# Patient Record
Sex: FEMALE | Race: ASIAN | HISPANIC OR LATINO | Employment: UNEMPLOYED | URBAN - METROPOLITAN AREA
[De-identification: names, ages, dates, MRNs, and addresses within clinical notes are randomized per-mention and may not be internally consistent; named-entity substitution may affect disease eponyms.]

---

## 2020-06-19 ENCOUNTER — TRANSCRIBE ORDERS (OUTPATIENT)
Dept: ADMINISTRATIVE | Facility: HOSPITAL | Age: 60
End: 2020-06-19

## 2020-06-19 ENCOUNTER — HOSPITAL ENCOUNTER (OUTPATIENT)
Dept: RADIOLOGY | Facility: HOSPITAL | Age: 60
Discharge: HOME | End: 2020-06-19
Attending: INTERNAL MEDICINE
Payer: COMMERCIAL

## 2020-06-19 ENCOUNTER — OFFICE VISIT (OUTPATIENT)
Dept: LAB | Facility: HOSPITAL | Age: 60
End: 2020-06-19
Attending: INTERNAL MEDICINE
Payer: COMMERCIAL

## 2020-06-19 DIAGNOSIS — Z00.8 ENCOUNTER FOR OTHER GENERAL EXAMINATION: Primary | ICD-10-CM

## 2020-06-19 DIAGNOSIS — Z00.8 ENCOUNTER FOR OTHER GENERAL EXAMINATION: ICD-10-CM

## 2020-06-19 PROCEDURE — 71046 X-RAY EXAM CHEST 2 VIEWS: CPT

## 2020-06-19 PROCEDURE — 93005 ELECTROCARDIOGRAM TRACING: CPT

## 2020-06-23 LAB
ATRIAL RATE: 81 BPM
P AXIS: 51 DEGREES
PR INTERVAL: 136 MS
QRS AXIS: 25 DEGREES
QRSD INTERVAL: 80 MS
QT INTERVAL: 366 MS
QTC INTERVAL: 425 MS
T WAVE AXIS: 51 DEGREES
VENTRICULAR RATE: 81 BPM

## 2020-06-23 PROCEDURE — 93010 ELECTROCARDIOGRAM REPORT: CPT | Performed by: INTERNAL MEDICINE

## 2020-06-25 LAB
CREAT ?TM UR-SCNC: 81.8 UMOL/L
HBA1C MFR BLD HPLC: 8.3 %
MICROALBUMIN/CREAT UR: <4 MG/G{CREAT}

## 2020-06-30 ENCOUNTER — OFFICE VISIT (OUTPATIENT)
Dept: CARDIOLOGY CLINIC | Facility: CLINIC | Age: 60
End: 2020-06-30
Payer: COMMERCIAL

## 2020-06-30 VITALS
WEIGHT: 180 LBS | DIASTOLIC BLOOD PRESSURE: 64 MMHG | HEIGHT: 65 IN | TEMPERATURE: 97.9 F | SYSTOLIC BLOOD PRESSURE: 128 MMHG | HEART RATE: 80 BPM | BODY MASS INDEX: 29.99 KG/M2 | OXYGEN SATURATION: 98 %

## 2020-06-30 DIAGNOSIS — E78.5 DYSLIPIDEMIA: ICD-10-CM

## 2020-06-30 DIAGNOSIS — R06.02 EXERTIONAL SHORTNESS OF BREATH: ICD-10-CM

## 2020-06-30 DIAGNOSIS — Z11.59 SCREENING FOR VIRAL DISEASE: ICD-10-CM

## 2020-06-30 DIAGNOSIS — E11.9 TYPE 2 DIABETES MELLITUS WITHOUT COMPLICATION, WITHOUT LONG-TERM CURRENT USE OF INSULIN (HCC): ICD-10-CM

## 2020-06-30 DIAGNOSIS — R94.31 ABNORMAL EKG: ICD-10-CM

## 2020-06-30 DIAGNOSIS — I10 ESSENTIAL HYPERTENSION: ICD-10-CM

## 2020-06-30 DIAGNOSIS — Z82.49 FAMILY HISTORY OF CORONARY ARTERY DISEASE: ICD-10-CM

## 2020-06-30 PROCEDURE — 99245 OFF/OP CONSLTJ NEW/EST HI 55: CPT | Performed by: INTERNAL MEDICINE

## 2020-06-30 RX ORDER — VALSARTAN 80 MG/1
80 TABLET ORAL EVERY MORNING
COMMUNITY
Start: 2020-06-07 | End: 2021-09-13 | Stop reason: SDUPTHER

## 2020-06-30 RX ORDER — LANCETS 30 GAUGE
EACH MISCELLANEOUS DAILY
COMMUNITY
Start: 2020-06-25

## 2020-06-30 RX ORDER — CARVEDILOL 12.5 MG/1
12.5 TABLET ORAL 2 TIMES DAILY
COMMUNITY
Start: 2020-06-18 | End: 2021-08-31 | Stop reason: ALTCHOICE

## 2020-06-30 RX ORDER — BLOOD SUGAR DIAGNOSTIC
STRIP MISCELLANEOUS DAILY
COMMUNITY
Start: 2020-06-25 | End: 2021-10-06 | Stop reason: SDUPTHER

## 2020-06-30 RX ORDER — ASPIRIN 81 MG/1
81 TABLET, CHEWABLE ORAL DAILY
Qty: 30 TABLET | Refills: 1 | Status: SHIPPED | OUTPATIENT
Start: 2020-06-30 | End: 2021-08-24 | Stop reason: ALTCHOICE

## 2020-06-30 RX ORDER — BLOOD-GLUCOSE METER
EACH MISCELLANEOUS
COMMUNITY
Start: 2020-06-29

## 2020-07-02 ENCOUNTER — HOSPITAL ENCOUNTER (OUTPATIENT)
Dept: NON INVASIVE DIAGNOSTICS | Facility: HOSPITAL | Age: 60
Discharge: HOME | End: 2020-07-02
Attending: INTERNAL MEDICINE
Payer: COMMERCIAL

## 2020-07-02 DIAGNOSIS — Z11.59 SCREENING FOR VIRAL DISEASE: ICD-10-CM

## 2020-07-02 DIAGNOSIS — E78.5 DYSLIPIDEMIA: ICD-10-CM

## 2020-07-02 DIAGNOSIS — I10 ESSENTIAL HYPERTENSION: ICD-10-CM

## 2020-07-02 DIAGNOSIS — R94.31 ABNORMAL EKG: ICD-10-CM

## 2020-07-02 DIAGNOSIS — Z82.49 FAMILY HISTORY OF CORONARY ARTERY DISEASE: ICD-10-CM

## 2020-07-02 DIAGNOSIS — E11.9 TYPE 2 DIABETES MELLITUS WITHOUT COMPLICATION, WITHOUT LONG-TERM CURRENT USE OF INSULIN (HCC): ICD-10-CM

## 2020-07-02 DIAGNOSIS — R06.02 EXERTIONAL SHORTNESS OF BREATH: ICD-10-CM

## 2020-07-02 PROCEDURE — 93306 TTE W/DOPPLER COMPLETE: CPT

## 2020-07-02 PROCEDURE — U0003 INFECTIOUS AGENT DETECTION BY NUCLEIC ACID (DNA OR RNA); SEVERE ACUTE RESPIRATORY SYNDROME CORONAVIRUS 2 (SARS-COV-2) (CORONAVIRUS DISEASE [COVID-19]), AMPLIFIED PROBE TECHNIQUE, MAKING USE OF HIGH THROUGHPUT TECHNOLOGIES AS DESCRIBED BY CMS-2020-01-R: HCPCS

## 2020-07-02 PROCEDURE — 93306 TTE W/DOPPLER COMPLETE: CPT | Performed by: INTERNAL MEDICINE

## 2020-07-03 LAB
ALBUMIN SERPL-MCNC: 4.5 G/DL (ref 3.8–4.9)
ALBUMIN/GLOB SERPL: 1.7 {RATIO} (ref 1.2–2.2)
ALP SERPL-CCNC: 115 IU/L (ref 39–117)
ALT SERPL-CCNC: 37 IU/L (ref 0–32)
AST SERPL-CCNC: 40 IU/L (ref 0–40)
BASOPHILS # BLD AUTO: 0.1 X10E3/UL (ref 0–0.2)
BASOPHILS NFR BLD AUTO: 1 %
BILIRUB SERPL-MCNC: 0.7 MG/DL (ref 0–1.2)
BUN SERPL-MCNC: 11 MG/DL (ref 6–24)
BUN/CREAT SERPL: 15 (ref 9–23)
CALCIUM SERPL-MCNC: 10.1 MG/DL (ref 8.7–10.2)
CHLORIDE SERPL-SCNC: 102 MMOL/L (ref 96–106)
CHOLEST SERPL-MCNC: 222 MG/DL (ref 100–199)
CHOLEST/HDLC SERPL: 4.3 RATIO (ref 0–4.4)
CO2 SERPL-SCNC: 23 MMOL/L (ref 20–29)
CREAT SERPL-MCNC: 0.74 MG/DL (ref 0.57–1)
EOSINOPHIL # BLD AUTO: 0.2 X10E3/UL (ref 0–0.4)
EOSINOPHIL NFR BLD AUTO: 3 %
ERYTHROCYTE [DISTWIDTH] IN BLOOD BY AUTOMATED COUNT: 13.1 % (ref 11.7–15.4)
GLOBULIN SER-MCNC: 2.7 G/DL (ref 1.5–4.5)
GLUCOSE SERPL-MCNC: 144 MG/DL (ref 65–99)
HCT VFR BLD AUTO: 44.7 % (ref 34–46.6)
HDLC SERPL-MCNC: 52 MG/DL
HGB BLD-MCNC: 14.6 G/DL (ref 11.1–15.9)
IMM GRANULOCYTES # BLD: 0 X10E3/UL (ref 0–0.1)
IMM GRANULOCYTES NFR BLD: 0 %
LDLC SERPL CALC-MCNC: 139 MG/DL (ref 0–99)
LYMPHOCYTES # BLD AUTO: 2.3 X10E3/UL (ref 0.7–3.1)
LYMPHOCYTES NFR BLD AUTO: 33 %
MCH RBC QN AUTO: 26.4 PG (ref 26.6–33)
MCHC RBC AUTO-ENTMCNC: 32.7 G/DL (ref 31.5–35.7)
MCV RBC AUTO: 81 FL (ref 79–97)
MONOCYTES # BLD AUTO: 0.6 X10E3/UL (ref 0.1–0.9)
MONOCYTES NFR BLD AUTO: 9 %
NEUTROPHILS # BLD AUTO: 3.9 X10E3/UL (ref 1.4–7)
NEUTROPHILS NFR BLD AUTO: 54 %
PLATELET # BLD AUTO: 257 X10E3/UL (ref 150–450)
POTASSIUM SERPL-SCNC: 4.3 MMOL/L (ref 3.5–5.2)
PROT SERPL-MCNC: 7.2 G/DL (ref 6–8.5)
RBC # BLD AUTO: 5.52 X10E6/UL (ref 3.77–5.28)
SL AMB EGFR AFRICAN AMERICAN: 103 ML/MIN/1.73
SL AMB EGFR NON AFRICAN AMERICAN: 90 ML/MIN/1.73
SL AMB VLDL CHOLESTEROL CALC: 31 MG/DL (ref 5–40)
SODIUM SERPL-SCNC: 141 MMOL/L (ref 134–144)
TRIGL SERPL-MCNC: 156 MG/DL (ref 0–149)
TSH SERPL DL<=0.005 MIU/L-ACNC: 0.81 UIU/ML (ref 0.45–4.5)
WBC # BLD AUTO: 7 X10E3/UL (ref 3.4–10.8)

## 2020-07-06 ENCOUNTER — TELEPHONE (OUTPATIENT)
Dept: CARDIOLOGY CLINIC | Facility: CLINIC | Age: 60
End: 2020-07-06

## 2020-07-06 DIAGNOSIS — E78.5 DYSLIPIDEMIA: ICD-10-CM

## 2020-07-06 DIAGNOSIS — E78.5 DYSLIPIDEMIA: Primary | ICD-10-CM

## 2020-07-06 DIAGNOSIS — Z82.49 FAMILY HISTORY OF CORONARY ARTERY DISEASE: ICD-10-CM

## 2020-07-06 DIAGNOSIS — R06.02 EXERTIONAL SHORTNESS OF BREATH: Primary | ICD-10-CM

## 2020-07-06 RX ORDER — ROSUVASTATIN CALCIUM 5 MG/1
5 TABLET, COATED ORAL DAILY
Qty: 90 TABLET | Refills: 1 | Status: SHIPPED | OUTPATIENT
Start: 2020-07-06 | End: 2021-08-31 | Stop reason: SDUPTHER

## 2020-07-06 NOTE — PROGRESS NOTES
Spoke to patient's insurance Dr   There were not approved cardiac catheterization in spite of patient's symptoms of exertional shortness of breath with bilateral shoulder pain  They recommend we consider CTA before considering cardiac catheterization  CTA requested  Patient's family made aware    If she has any symptoms of chest pain she should go to the hospital

## 2020-07-07 ENCOUNTER — TELEPHONE (OUTPATIENT)
Dept: CARDIOLOGY CLINIC | Facility: CLINIC | Age: 60
End: 2020-07-07

## 2020-07-07 NOTE — TELEPHONE ENCOUNTER
----- Message from Leopoldo Boas, MD sent at 7/3/2020 10:09 AM EDT -----  Patient's echo shows normal LV systolic function  No significant valvular disease  Patient can keep an appointment  Please call patient about echo report

## 2020-07-09 LAB — SARS-COV-2 RNA SPEC QL NAA+PROBE: NOT DETECTED

## 2020-07-17 ENCOUNTER — TELEPHONE (OUTPATIENT)
Dept: CARDIOLOGY CLINIC | Facility: CLINIC | Age: 60
End: 2020-07-17

## 2020-07-17 NOTE — TELEPHONE ENCOUNTER
S/w dtr - pt was confirming if she was to continue her coreg 12 5 mg bid - per dr Celeste To notes she will continue

## 2020-07-17 NOTE — TELEPHONE ENCOUNTER
----- Message from Kylah Garcia MD sent at 7/17/2020 11:31 AM EDT -----  Please let the patient know no significant blockage but does have some plaque causing about 25% stenosis she need to take her statins and control sugar    She is okay to travel from cardiac point of view

## 2020-07-30 ENCOUNTER — TELEPHONE (OUTPATIENT)
Dept: CARDIOLOGY CLINIC | Facility: CLINIC | Age: 60
End: 2020-07-30

## 2020-07-30 NOTE — TELEPHONE ENCOUNTER
Spoke to them  Coreg is decreased to 6 2 mg twice a day  They will break the pill they have enough medications  This is for our record only

## 2021-08-24 ENCOUNTER — OFFICE VISIT (OUTPATIENT)
Dept: FAMILY MEDICINE CLINIC | Facility: CLINIC | Age: 61
End: 2021-08-24
Payer: COMMERCIAL

## 2021-08-24 VITALS
SYSTOLIC BLOOD PRESSURE: 112 MMHG | WEIGHT: 171 LBS | RESPIRATION RATE: 18 BRPM | HEIGHT: 60 IN | BODY MASS INDEX: 33.57 KG/M2 | DIASTOLIC BLOOD PRESSURE: 92 MMHG | TEMPERATURE: 98.4 F | OXYGEN SATURATION: 97 % | HEART RATE: 81 BPM

## 2021-08-24 DIAGNOSIS — Z13.83 SCREENING FOR CARDIOVASCULAR, RESPIRATORY, AND GENITOURINARY DISEASES: ICD-10-CM

## 2021-08-24 DIAGNOSIS — Z12.31 ENCOUNTER FOR SCREENING MAMMOGRAM FOR MALIGNANT NEOPLASM OF BREAST: ICD-10-CM

## 2021-08-24 DIAGNOSIS — Z11.59 ENCOUNTER FOR HEPATITIS C SCREENING TEST FOR LOW RISK PATIENT: ICD-10-CM

## 2021-08-24 DIAGNOSIS — Z12.11 SCREENING FOR COLON CANCER: ICD-10-CM

## 2021-08-24 DIAGNOSIS — Z11.4 SCREENING FOR HIV (HUMAN IMMUNODEFICIENCY VIRUS): ICD-10-CM

## 2021-08-24 DIAGNOSIS — Z13.6 SCREENING FOR CARDIOVASCULAR, RESPIRATORY, AND GENITOURINARY DISEASES: ICD-10-CM

## 2021-08-24 DIAGNOSIS — E11.9 TYPE 2 DIABETES MELLITUS WITHOUT COMPLICATION, WITHOUT LONG-TERM CURRENT USE OF INSULIN (HCC): ICD-10-CM

## 2021-08-24 DIAGNOSIS — Z13.29 SCREENING FOR THYROID DISORDER: ICD-10-CM

## 2021-08-24 DIAGNOSIS — Z13.89 SCREENING FOR CARDIOVASCULAR, RESPIRATORY, AND GENITOURINARY DISEASES: ICD-10-CM

## 2021-08-24 DIAGNOSIS — E78.5 DYSLIPIDEMIA: ICD-10-CM

## 2021-08-24 DIAGNOSIS — I10 ESSENTIAL HYPERTENSION: ICD-10-CM

## 2021-08-24 DIAGNOSIS — Z00.00 WELL ADULT EXAM: Primary | ICD-10-CM

## 2021-08-24 PROCEDURE — 99386 PREV VISIT NEW AGE 40-64: CPT | Performed by: FAMILY MEDICINE

## 2021-08-24 PROCEDURE — 3725F SCREEN DEPRESSION PERFORMED: CPT | Performed by: FAMILY MEDICINE

## 2021-08-24 RX ORDER — VALSARTAN 80 MG/1
80 TABLET ORAL DAILY
COMMUNITY
End: 2021-08-24 | Stop reason: SDUPTHER

## 2021-08-24 NOTE — PROGRESS NOTES
FAMILY PRACTICE HEALTH MAINTENANCE OFFICE VISIT  [facility]    NAME: Santiago Gilbert  AGE: 63 y o  SEX: female  : 1960     DATE: 2021    Assessment and Plan     1  Well adult exam    2  Essential hypertension  Comments:  continue losartan for now  she will follow up with her cardiologist  was previously on coreg as well, but has not been compliant  Orders:  -     Comprehensive metabolic panel; Future  -     Lipid Panel with Direct LDL reflex; Future  -     Ambulatory referral to Cardiology; Future    3  Dyslipidemia  Comments:  she was previously only crestor, but has not been compliant with it  will recheck lipid panel  Orders:  -     Comprehensive metabolic panel; Future  -     Lipid Panel with Direct LDL reflex; Future  -     Ambulatory referral to Cardiology; Future    4  Type 2 diabetes mellitus without complication, without long-term current use of insulin (HCC)  Comments:  continue metformin  will check a1c  f/u in 1week  Orders:  -     Hemoglobin A1C; Future  -     Ambulatory referral to Cardiology; Future    5  Screening for cardiovascular, respiratory, and genitourinary diseases  -     CBC and differential; Future    6  Screening for thyroid disorder  -     TSH, 3rd generation with Free T4 reflex; Future    7  Encounter for hepatitis C screening test for low risk patient  -     Hepatitis C antibody; Future    8  Screening for HIV (human immunodeficiency virus)  -     HIV 1/2 w/ Reflex (Multispot); Future    9  Encounter for screening mammogram for malignant neoplasm of breast  -     Mammo screening bilateral w 3d & cad; Future; Expected date: 2021    10  Screening for colon cancer  -     Ambulatory referral to Gastroenterology;  Future        · Patient Counseling:   · Nutrition: Stressed importance of a well balanced diet, moderation of sodium/saturated fat, caloric balance and sufficient intake of fiber  · Exercise: Stressed the importance of regular exercise with a goal of 150 minutes per week  · Dental Health: Discussed daily flossing and brushing and regular dental visits   · Immunizations reviewed: Declined recommended vaccinations  · Discussed benefits of:  Colon Cancer Screening, Mammogram  and Screening labs   BMI Counseling: Body mass index is 33 4 kg/m²  Discussed with patient's BMI with her  The BMI is above normal  Nutrition recommendations include reducing portion sizes, decreasing overall calorie intake, 3-5 servings of fruits/vegetables daily, reducing fast food intake and consuming healthier snacks  Exercise recommendations include moderate aerobic physical activity for 150 minutes/week  Return in about 1 week (around 8/31/2021)  Chief Complaint     Chief Complaint   Patient presents with    [facility]    Physical Exam       History of Present Illness     HPI    Well Adult Physical   Patient here for a comprehensive physical exam       Diet and Physical Activity  Diet: well balanced diet  Exercise: intermittently      Depression Screen  PHQ-9 Depression Screening    PHQ-9:   Frequency of the following problems over the past two weeks:      Little interest or pleasure in doing things: 1 - several days  Feeling down, depressed, or hopeless: 0 - not at all  PHQ-2 Score: 1          General Health  Hearing: Normal:  bilateral  Vision: no vision problems  Dental: no dental visits for >1 year    Reproductive Health  No issues       The following portions of the patient's history were reviewed and updated as appropriate: allergies, current medications, past family history, past medical history, past social history, past surgical history and problem list     Review of Systems     Review of Systems   Constitutional: Negative  HENT: Negative  Eyes: Negative  Respiratory: Negative  Cardiovascular: Negative  Gastrointestinal: Negative  Endocrine: Negative  Genitourinary: Negative      Musculoskeletal: Negative  Skin: Negative  Allergic/Immunologic: Negative  Neurological: Negative  Hematological: Negative  Psychiatric/Behavioral: Negative  Past Medical History     History reviewed  No pertinent past medical history  Past Surgical History     Past Surgical History:   Procedure Laterality Date    CHOLECYSTECTOMY  2000       Social History     Social History     Socioeconomic History    Marital status: /Civil Union     Spouse name: None    Number of children: None    Years of education: None    Highest education level: None   Occupational History    None   Tobacco Use    Smoking status: Never Smoker    Smokeless tobacco: Never Used   Vaping Use    Vaping Use: Never used   Substance and Sexual Activity    Alcohol use: Not Currently    Drug use: Never    Sexual activity: None   Other Topics Concern    None   Social History Narrative    None     Social Determinants of Health     Financial Resource Strain:     Difficulty of Paying Living Expenses:    Food Insecurity:     Worried About Running Out of Food in the Last Year:     Ran Out of Food in the Last Year:    Transportation Needs:     Lack of Transportation (Medical):      Lack of Transportation (Non-Medical):    Physical Activity:     Days of Exercise per Week:     Minutes of Exercise per Session:    Stress:     Feeling of Stress :    Social Connections:     Frequency of Communication with Friends and Family:     Frequency of Social Gatherings with Friends and Family:     Attends Jainism Services:     Active Member of Clubs or Organizations:     Attends Club or Organization Meetings:     Marital Status:    Intimate Partner Violence:     Fear of Current or Ex-Partner:     Emotionally Abused:     Physically Abused:     Sexually Abused:        Family History     Family History   Problem Relation Age of Onset    Diabetes Mother     Heart attack Father     Diabetes Father     Heart attack Sister    Neftaly Crane Diabetes Sister        Current Medications       Current Outpatient Medications:     Blood Glucose Monitoring Suppl (ONE TOUCH ULTRA 2) w/Device KIT, USE AS DIRECTED ONCE A DAY, Disp: , Rfl:     Lancets (ONETOUCH DELICA PLUS [id number]) MISC, daily Use as directed, Disp: , Rfl:     metFORMIN (GLUCOPHAGE) 1000 MG tablet, Take 1,000 mg by mouth 2 (two) times a day with meals, Disp: , Rfl:     ONETOUCH ULTRA test strip, daily, Disp: , Rfl:     valsartan (DIOVAN) 80 mg tablet, Take 80 mg by mouth every morning, Disp: , Rfl:     carvedilol (COREG) 12 5 mg tablet, Take 12 5 mg by mouth 2 (two) times a day (Patient not taking: Reported on 8/24/2021), Disp: , Rfl:     rosuvastatin (CRESTOR) 5 mg tablet, Take 1 tablet (5 mg total) by mouth daily (Patient not taking: Reported on 8/24/2021), Disp: 90 tablet, Rfl: 1     Allergies     No Known Allergies    Objective     /92   Pulse 81   Temp 98 4 °F (36 9 °C)   Resp 18   Ht 5' (1 524 m)   Wt 77 6 kg (171 lb)   SpO2 97%   BMI 33 40 kg/m²      Physical Exam  Constitutional:       General: She is not in acute distress  Appearance: Normal appearance  She is well-developed  She is not diaphoretic  HENT:      Head: Normocephalic and atraumatic  Right Ear: Tympanic membrane, ear canal and external ear normal  There is no impacted cerumen  Left Ear: Tympanic membrane, ear canal and external ear normal  There is no impacted cerumen  Eyes:      General: No scleral icterus  Right eye: No discharge  Left eye: No discharge  Extraocular Movements: Extraocular movements intact  Conjunctiva/sclera: Conjunctivae normal       Pupils: Pupils are equal, round, and reactive to light  Cardiovascular:      Rate and Rhythm: Normal rate and regular rhythm  Heart sounds: Normal heart sounds  No murmur heard  No friction rub  No gallop  Pulmonary:      Effort: Pulmonary effort is normal  No respiratory distress        Breath sounds: Normal breath sounds  No wheezing or rales  Chest:      Chest wall: No tenderness  Abdominal:      General: Bowel sounds are normal  There is no distension  Palpations: Abdomen is soft  There is no mass  Tenderness: There is no abdominal tenderness  There is no guarding or rebound  Musculoskeletal:         General: No deformity  Normal range of motion  Cervical back: Normal range of motion and neck supple  Skin:     General: Skin is warm and dry  Findings: No erythema or rash  Neurological:      Mental Status: She is alert and oriented to person, place, and time  Psychiatric:         Behavior: Behavior normal          Thought Content:  Thought content normal          Judgment: Judgment normal             Visual Acuity Screening    Right eye Left eye Both eyes   Without correction: 20/25 20/25 20/25   With correction:              Cherry Duverney, MD  [facility]

## 2021-08-26 ENCOUNTER — OFFICE VISIT (OUTPATIENT)
Dept: CARDIOLOGY CLINIC | Facility: CLINIC | Age: 61
End: 2021-08-26
Payer: COMMERCIAL

## 2021-08-26 VITALS
WEIGHT: 170 LBS | SYSTOLIC BLOOD PRESSURE: 90 MMHG | HEIGHT: 60 IN | HEART RATE: 77 BPM | DIASTOLIC BLOOD PRESSURE: 60 MMHG | TEMPERATURE: 97.6 F | OXYGEN SATURATION: 97 % | BODY MASS INDEX: 33.38 KG/M2

## 2021-08-26 DIAGNOSIS — E11.9 TYPE 2 DIABETES MELLITUS WITHOUT COMPLICATION, WITHOUT LONG-TERM CURRENT USE OF INSULIN (HCC): ICD-10-CM

## 2021-08-26 DIAGNOSIS — I10 ESSENTIAL HYPERTENSION: ICD-10-CM

## 2021-08-26 DIAGNOSIS — E78.5 DYSLIPIDEMIA: ICD-10-CM

## 2021-08-26 PROCEDURE — 3078F DIAST BP <80 MM HG: CPT | Performed by: INTERNAL MEDICINE

## 2021-08-26 PROCEDURE — 99215 OFFICE O/P EST HI 40 MIN: CPT | Performed by: INTERNAL MEDICINE

## 2021-08-26 PROCEDURE — 93000 ELECTROCARDIOGRAM COMPLETE: CPT | Performed by: INTERNAL MEDICINE

## 2021-08-26 PROCEDURE — 3074F SYST BP LT 130 MM HG: CPT | Performed by: INTERNAL MEDICINE

## 2021-08-26 NOTE — PROGRESS NOTES
Progress Note - Cardiology Office  [facility]    Traci Brown 63 y o  female MRN: [id number]  : 1960  Encounter: [id number]      ASSESSMENT:   Hypertension  BP today is 90/60 mmHg  Patient has stopped taking Coreg and is currently on valsartan  Advised her to hold valsartan if systolic BP is less than 722    TTE, 2020:  EF 55-60%, G1DD    Diabetes mellitus, T2    Dyslipidemia  Patient stop taking Crestor   PCP is rechecking lipid profile before deciding further management    Family history of CAD    Sister  possibly of an MI    Dyspnea on exertion    Anxiety disorder    Abnormal EKG  Inverted T-waves in lateral precordial leads    Coronary calcium score, 2020:  18    CCTA, 2020:     IMPRESSION:    non obstructive 0-24% plaque involving proximal LAD, proximal circumflex and diagonal branch    The coronary artery calcium score is 18     The right coronary arises from the left sinus of valsalva and travels anterior between that aortic root and PA  The vessel arises at a sharp angle and there is no intra arterial course  The proximal vessel appears compressed       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  A travels anterior between the aortic root and PA   it arises at a sharp angle and there is no intra arterial course  The proximal vessel appears compressed      RECOMMENDATIONS:  Check lipid profile and restart statin  Advised patient to regularly check her blood pressure in the morning before taking her antihypertensive medications and to hold blood pressure med since if systolic BP is less than 457  Bring blood pressure diary at next office visit  Left message with Dr Duke Campuzano , [address] surgeon , for evaluation of Anomalous RCA origin with possible compression between aorta and PA      Please call [phone] if any questions  HPI :     Traci Brown is a 63y o  year old female who came for follow up    She does not appear to have any definite cardiac symptoms like angina, syncope or exertional dyspnea  She comes with her daughter-in-law according to home she has a lot of anxiety related symptoms for which I advised her to seek help from her PCP  I showed her the report of her coronary CTA and explained the findings of anomalous RCA origin from left coronary sinus and its proximal compression and advised her to see CT surgery for further management advise  REVIEW OF SYSTEMS:  Denies any new or acute cardiac symptoms like angina, unusual dyspnea or syncope    Historical Information   No past medical history on file    Past Surgical History:   Procedure Laterality Date    CHOLECYSTECTOMY  2000     Social History     Substance and Sexual Activity   Alcohol Use Not Currently     Social History     Substance and Sexual Activity   Drug Use Never     Social History     Tobacco Use   Smoking Status Never Smoker   Smokeless Tobacco Never Used     Family History:   Family History   Problem Relation Age of Onset    Diabetes Mother     Heart attack Father     Diabetes Father     Heart attack Sister     Diabetes Sister        Meds/Allergies     No Known Allergies    Current Outpatient Medications:     Blood Glucose Monitoring Suppl (ONE TOUCH ULTRA 2) w/Device KIT, USE AS DIRECTED ONCE A DAY, Disp: , Rfl:     carvedilol (COREG) 12 5 mg tablet, Take 12 5 mg by mouth 2 (two) times a day (Patient not taking: Reported on 8/24/2021), Disp: , Rfl:     Lancets (ONETOUCH DELICA PLUS [id number]) MISC, daily Use as directed, Disp: , Rfl:     metFORMIN (GLUCOPHAGE) 1000 MG tablet, Take 1,000 mg by mouth 2 (two) times a day with meals, Disp: , Rfl:     ONETOUCH ULTRA test strip, daily, Disp: , Rfl:     rosuvastatin (CRESTOR) 5 mg tablet, Take 1 tablet (5 mg total) by mouth daily (Patient not taking: Reported on 8/24/2021), Disp: 90 tablet, Rfl: 1    valsartan (DIOVAN) 80 mg tablet, Take 80 mg by mouth every morning, Disp: , Rfl:     Vitals:   BP 90/60 mmHg  HR 77/Min  BP Readings from Last 3 Encounters:   21 112/92   20 128/64       Physical Exam:  Physical Exam    Neurologic:  Alert & oriented x 3, no new focal deficits, Not in any acute distress,  Constitutional:  Obese  Eyes:  Pupil equal and reacting to light, conjunctiva normal,   HENT:  Atraumatic, oropharynx moist, Neck- normal range of motion, no tenderness,  Neck supple, No JVP, No LNP   Respiratory:  Bilateral air entry, mostly clear to auscultation  Cardiovascular: S1-S2 regular with a I/VI ejection systolic murmur   GI:  Soft, nondistended, normal bowel sounds, nontender, no hepatosplenomegaly appreciated  Musculoskeletal:  No edema, no tenderness, no deformities  Skin:  Well hydrated, no rash   Lymphatic:  No lymphadenopathy noted   Extremities:  No edema and distal pulses are present        Diagnostic Studies Review Cardio:      EKG:  Normal sinus rhythm, heart rate 77 per minute, nonspecific T-wave abnormality in inferior lateral leads    Cardiac testing:   Results for orders placed during the hospital encounter of 20    Echo complete with contrast if indicated    [address]  [address] [address]  [phone]    Transthoracic Echocardiogram  2D, M-mode, Doppler, and Color Doppler    Study date:  2020    Patient: Antoni Francis  MR number: [id number]  Account number: [de-identified]  : 1960  Age: 64 years  Gender: Female  Status: Outpatient  Location: Echo lab  Height: 65 in  Weight: 179 5 lb  BP: 121/ 85 mmHg    Indications: Abnormal EKG    Diagnoses: R06 00 - Dyspnea, unspecified    Sonographer:  Collette Ripple, RCS  Primary Physician:  Isaac Olivares MD  Referring Physician:  Jaycee Leija MD  Group:  [facility]  Interpreting Physician:  Javier Sevilla DO    SUMMARY    LEFT VENTRICLE:  Systolic function was normal by visual assessment   Ejection fraction was estimated in the range of 55 % to 60 %  There were no regional wall motion abnormalities  Wall thickness was at the upper limits of normal   Doppler parameters were consistent with abnormal left ventricular relaxation (grade 1 diastolic dysfunction)  AORTIC VALVE:  There was no evidence for stenosis  There was no significant regurgitation  TRICUSPID VALVE:  There was mild regurgitation  Pulmonary artery systolic pressure was within the normal range  HISTORY: PRIOR HISTORY: HTN,Dyslipidemia,Diabetes,Abnormal EKG  PROCEDURE: The procedure was performed in the echo lab  This was a routine study  The transthoracic approach was used  The study included complete 2D imaging, M-mode, complete spectral Doppler, and color Doppler  The heart rate was 73 bpm,  at the start of the study  Images were obtained from the parasternal, apical, subcostal, and suprasternal notch acoustic windows  Image quality was adequate  LEFT VENTRICLE: Size was normal  Systolic function was normal by visual assessment  Ejection fraction was estimated in the range of 55 % to 60 %  There were no regional wall motion abnormalities  Wall thickness was at the upper limits of  normal  No evidence of apical thrombus  DOPPLER: Doppler parameters were consistent with abnormal left ventricular relaxation (grade 1 diastolic dysfunction)  RIGHT VENTRICLE: The size was normal  Systolic function was normal  Wall thickness was normal     LEFT ATRIUM: Size was normal     RIGHT ATRIUM: Size was normal     MITRAL VALVE: Valve structure was normal  There was normal leaflet separation  DOPPLER: The transmitral velocity was within the normal range  There was no evidence for stenosis  There was no significant regurgitation  AORTIC VALVE: The valve was trileaflet  Leaflets exhibited normal thickness, normal cuspal separation, and sclerosis  DOPPLER: Transaortic velocity was within the normal range  There was no evidence for stenosis   There was no significant  regurgitation  TRICUSPID VALVE: The valve structure was normal  There was normal leaflet separation  DOPPLER: The transtricuspid velocity was within the normal range  There was no evidence for stenosis  There was mild regurgitation  Pulmonary artery  systolic pressure was within the normal range  Estimated peak PA pressure was 25 mmHg  PULMONIC VALVE: Leaflets exhibited normal thickness, no calcification, and normal cuspal separation  DOPPLER: The transpulmonic velocity was within the normal range  There was no significant regurgitation  PERICARDIUM: There was no pericardial effusion  The pericardium was normal in appearance  AORTA: The root exhibited normal size  SYSTEMIC VEINS: IVC: The inferior vena cava was normal in size  SYSTEM MEASUREMENT TABLES    2D mode  AoR Diam 2D: 2 9 cm  LA Diam (2D): 3 1 cm  LA/Ao (2D): 1 07  FS (2D Teich): 27 8 %  IVSd (2D): 0 83 cm  LVDEV: 90 5 cmï¾³  LVESV: 41 6 cmï¾³  LVIDd(2D): 4 46 cm  LVISd (2D): 3 22 cm  LVOT Area 2D: 3 14 cmï¾²  LVPWd (2D): 1 05 cm  SV (Teich): 48 9 cmï¾³    Apical four chamber  LVEDV MOD A4C: 83 cmï¾³  LVEF A4C: 54 %  LVLD A4C: 6 71 cm    Unspecified Scan Mode  AMBER Cont Eq (Peak Power): 1 89 cmï¾²  LVOT Diam : 2 cm  LVOT Vmax: 923 mm/s  LVOT Vmax; Mean: 923 mm/s  Peak Grad ; Mean: 3 mm[Hg]  MV Peak A Power: 923 mm/s  MV Peak E Power  Mean: 587 mm/s  MVA (PHT): 3 1 cmï¾²  PHT: 71 ms  Max P mm[Hg]  V Max: 2050 mm/s  Vmax: 2050 mm/s  RA Area: 15 9 cmï¾²  RA Volume: 42 8 cmï¾³  TAPSE: 1 7 cm    [employer] Accredited [facility]    Prepared and electronically signed by    Kinjal Ramesh DO  Signed 2020 14:30:10      Imaging:  Chest X-Ray:   No Chest XR results available for this patient  CT-scan of the chest:     No CTA results available for this patient    Lab Review   Lab Results   Component Value Date    WBC 7 0 2020    HGB 14 6 2020    HCT 44 7 2020    MCV 81 2020 RDW 13 1 07/02/2020     07/02/2020     BMP:  Lab Results   Component Value Date    SODIUM 141 07/02/2020    K 4 3 07/02/2020     07/02/2020    CO2 23 07/02/2020    BUN 11 07/02/2020    CREATININE 0 74 07/02/2020    GLUC 144 (H) 07/02/2020     LFT:  Lab Results   Component Value Date    AST 40 07/02/2020    ALT 37 (H) 07/02/2020    TP 7 2 07/02/2020    ALB 4 5 07/02/2020      No components found for: TSH3  No results found for: [facility]  Lab Results   Component Value Date    HGBA1C 8 3 06/25/2020     Lipid Profile:   Lab Results   Component Value Date    CHOLESTEROL 222 (H) 07/02/2020    HDL 52 07/02/2020    LDLCALC 139 (H) 07/02/2020    TRIG 156 (H) 07/02/2020     Lab Results   Component Value Date    CHOLESTEROL 222 (H) 07/02/2020     No results found for: CKTOTAL, CKMB, CKMBINDEX, TROPONINI  No results found for: NTBNP   No results found for this or any previous visit (from the past 672 hour(s))  Dr Frida Briceño MD, [facility] [place]      "This note has been constructed using a voice recognition system  Therefore there may be syntax, spelling, and/or grammatical errors   Please call if you have any questions  "

## 2021-08-27 LAB
ALBUMIN SERPL-MCNC: 4.5 G/DL (ref 3.8–4.9)
ALBUMIN/GLOB SERPL: 1.6 {RATIO} (ref 1.2–2.2)
ALP SERPL-CCNC: 117 IU/L (ref 48–121)
ALT SERPL-CCNC: 18 IU/L (ref 0–32)
AST SERPL-CCNC: 23 IU/L (ref 0–40)
BASOPHILS # BLD AUTO: 0.1 X10E3/UL (ref 0–0.2)
BASOPHILS NFR BLD AUTO: 1 %
BILIRUB SERPL-MCNC: 0.8 MG/DL (ref 0–1.2)
BUN SERPL-MCNC: 11 MG/DL (ref 6–24)
BUN/CREAT SERPL: 14 (ref 9–23)
CALCIUM SERPL-MCNC: 10.3 MG/DL (ref 8.7–10.2)
CHLORIDE SERPL-SCNC: 105 MMOL/L (ref 96–106)
CHOLEST SERPL-MCNC: 208 MG/DL (ref 100–199)
CO2 SERPL-SCNC: 22 MMOL/L (ref 20–29)
CREAT SERPL-MCNC: 0.76 MG/DL (ref 0.57–1)
EOSINOPHIL # BLD AUTO: 0.2 X10E3/UL (ref 0–0.4)
EOSINOPHIL NFR BLD AUTO: 3 %
ERYTHROCYTE [DISTWIDTH] IN BLOOD BY AUTOMATED COUNT: 12.7 % (ref 11.7–15.4)
GLOBULIN SER-MCNC: 2.8 G/DL (ref 1.5–4.5)
GLUCOSE SERPL-MCNC: 109 MG/DL (ref 65–99)
HBA1C MFR BLD: 6.3 % (ref 4.8–5.6)
HCT VFR BLD AUTO: 42.5 % (ref 34–46.6)
HCV AB S/CO SERPL IA: <0.1 S/CO RATIO (ref 0–0.9)
HDLC SERPL-MCNC: 52 MG/DL
HGB BLD-MCNC: 14.2 G/DL (ref 11.1–15.9)
HIV 1+2 AB+HIV1 P24 AG SERPL QL IA: NON REACTIVE
IMM GRANULOCYTES # BLD: 0 X10E3/UL (ref 0–0.1)
IMM GRANULOCYTES NFR BLD: 0 %
LDLC SERPL CALC-MCNC: 126 MG/DL (ref 0–99)
LYMPHOCYTES # BLD AUTO: 2.4 X10E3/UL (ref 0.7–3.1)
LYMPHOCYTES NFR BLD AUTO: 33 %
MCH RBC QN AUTO: 26.9 PG (ref 26.6–33)
MCHC RBC AUTO-ENTMCNC: 33.4 G/DL (ref 31.5–35.7)
MCV RBC AUTO: 81 FL (ref 79–97)
MICRODELETION SYND BLD/T FISH: NORMAL
MONOCYTES # BLD AUTO: 0.7 X10E3/UL (ref 0.1–0.9)
MONOCYTES NFR BLD AUTO: 9 %
NEUTROPHILS # BLD AUTO: 3.8 X10E3/UL (ref 1.4–7)
NEUTROPHILS NFR BLD AUTO: 54 %
PLATELET # BLD AUTO: 262 X10E3/UL (ref 150–450)
POTASSIUM SERPL-SCNC: 4.3 MMOL/L (ref 3.5–5.2)
PROT SERPL-MCNC: 7.3 G/DL (ref 6–8.5)
RBC # BLD AUTO: 5.28 X10E6/UL (ref 3.77–5.28)
SL AMB EGFR AFRICAN AMERICAN: 99 ML/MIN/1.73
SL AMB EGFR NON AFRICAN AMERICAN: 86 ML/MIN/1.73
SODIUM SERPL-SCNC: 141 MMOL/L (ref 134–144)
TRIGL SERPL-MCNC: 172 MG/DL (ref 0–149)
TSH SERPL DL<=0.005 MIU/L-ACNC: 0.06 UIU/ML (ref 0.45–4.5)
WBC # BLD AUTO: 7.2 X10E3/UL (ref 3.4–10.8)

## 2021-08-27 PROCEDURE — 3044F HG A1C LEVEL LT 7.0%: CPT | Performed by: FAMILY MEDICINE

## 2021-08-31 ENCOUNTER — OFFICE VISIT (OUTPATIENT)
Dept: FAMILY MEDICINE CLINIC | Facility: CLINIC | Age: 61
End: 2021-08-31
Payer: COMMERCIAL

## 2021-08-31 VITALS
HEART RATE: 80 BPM | TEMPERATURE: 97 F | BODY MASS INDEX: 33.77 KG/M2 | HEIGHT: 60 IN | RESPIRATION RATE: 20 BRPM | DIASTOLIC BLOOD PRESSURE: 84 MMHG | SYSTOLIC BLOOD PRESSURE: 128 MMHG | WEIGHT: 172 LBS

## 2021-08-31 DIAGNOSIS — E11.9 TYPE 2 DIABETES MELLITUS WITHOUT COMPLICATION, WITHOUT LONG-TERM CURRENT USE OF INSULIN (HCC): Primary | ICD-10-CM

## 2021-08-31 DIAGNOSIS — R79.89 ABNORMAL TSH: ICD-10-CM

## 2021-08-31 DIAGNOSIS — E78.5 DYSLIPIDEMIA: ICD-10-CM

## 2021-08-31 DIAGNOSIS — I10 ESSENTIAL HYPERTENSION: ICD-10-CM

## 2021-08-31 PROCEDURE — 99214 OFFICE O/P EST MOD 30 MIN: CPT | Performed by: FAMILY MEDICINE

## 2021-08-31 PROCEDURE — 3008F BODY MASS INDEX DOCD: CPT | Performed by: FAMILY MEDICINE

## 2021-08-31 PROCEDURE — 1036F TOBACCO NON-USER: CPT | Performed by: FAMILY MEDICINE

## 2021-08-31 RX ORDER — ROSUVASTATIN CALCIUM 5 MG/1
5 TABLET, COATED ORAL DAILY
Qty: 90 TABLET | Refills: 1 | Status: SHIPPED | OUTPATIENT
Start: 2021-08-31 | End: 2021-11-01 | Stop reason: SDUPTHER

## 2021-08-31 NOTE — PROGRESS NOTES
Assessment/Plan:       Diagnoses and all orders for this visit:    Type 2 diabetes mellitus without complication, without long-term current use of insulin ([facility])  Comments:  A1c 6 3  continue current dose of metformin  eye exam done in office today  Orders:  -     HEMOGLOBIN A1C W/ EAG ESTIMATION; Future  -     IRIS Diabetic eye exam    Abnormal TSH  Comments:  she has symptoms of hyperthyroidism including sweating, anxiety/irritability, tachycardia, difficulty sleeping, headaches  TSH decreased on recent BW  Orders:  -     Ambulatory referral to Endocrinology; Future    Dyslipidemia  Comments:  restart crestor  Orders:  -     Lipid panel; Future  -     Comprehensive metabolic panel; Future  -     rosuvastatin (CRESTOR) 5 mg tablet; Take 1 tablet (5 mg total) by mouth daily    Essential hypertension  Comments:  continue valsartan  she will monitor BPs at home and hold valsartan if BPs are low  Subjective:      Patient ID: Latesha Hubbard is a 63 y o  female  HPI  Elena Basilio presents today to review blood work and discuss medical problems  She has history of type II DM, dyslipidemia  Most recent A1c controlled at 6 3 on metformin  Denies hypo/hyperglycemic symptoms  Cholesterol levels remain elevated  She had been non compliant with her Crestor  Recently seen by cardiology for hypertension  Advised to hold valsartan if blood pressures are low  She does report symptoms of hyperthyroidism including excessive sweating, irritability/anxiety, tachycardia, difficulty sleeping, headaches  TSH decreased on recent blood work  The following portions of the patient's history were reviewed and updated as appropriate: allergies, current medications, past family history, past medical history, past social history, past surgical history and problem list     Review of Systems   Constitutional: Positive for diaphoresis  HENT: Negative  Eyes: Negative  Respiratory: Negative      Cardiovascular: Positive for palpitations  Gastrointestinal: Negative  Endocrine: Negative  Genitourinary: Negative  Musculoskeletal: Negative  Skin: Negative  Allergic/Immunologic: Negative  Neurological: Positive for headaches  Hematological: Negative  Psychiatric/Behavioral: Positive for sleep disturbance  The patient is nervous/anxious  Objective:      /84   Pulse 80   Temp (!) 97 °F (36 1 °C)   Resp 20   Ht 5' (1 524 m)   Wt 78 kg (172 lb)   BMI 33 59 kg/m²          Physical Exam  Constitutional:       General: She is not in acute distress  Appearance: She is well-developed  She is not diaphoretic  HENT:      Head: Normocephalic and atraumatic  Cardiovascular:      Rate and Rhythm: Normal rate and regular rhythm  Pulses: no weak pulses     Heart sounds: Normal heart sounds  No murmur heard  No friction rub  No gallop  Pulmonary:      Effort: Pulmonary effort is normal  No respiratory distress  Breath sounds: Normal breath sounds  No wheezing or rales  Chest:      Chest wall: No tenderness  Musculoskeletal:         General: No deformity  Normal range of motion  Cervical back: Normal range of motion and neck supple  Feet:      Right foot:      Skin integrity: No ulcer, skin breakdown, erythema, warmth, callus or dry skin  Left foot:      Skin integrity: No ulcer, skin breakdown, erythema, warmth, callus or dry skin  Skin:     General: Skin is warm and dry  Neurological:      Mental Status: She is alert and oriented to person, place, and time  Psychiatric:         Behavior: Behavior normal          Thought Content: Thought content normal          Judgment: Judgment normal        Patient's shoes and socks removed  Right Foot/Ankle   Right Foot Inspection  Skin Exam: skin normal and skin intact no dry skin, no warmth, no callus, no erythema, no maceration, no abnormal color, no pre-ulcer, no ulcer and no callus                            Sensory Monofilament testing: intact  Vascular  Capillary refills: < 3 seconds      Left Foot/Ankle  Left Foot Inspection  Skin Exam: skin normal and skin intactno dry skin, no warmth, no erythema, no maceration, normal color, no pre-ulcer, no ulcer and no callus                                         Sensory       Monofilament: intact  Vascular  Capillary refills: < 3 seconds    Assign Risk Category:  No deformity present; No loss of protective sensation;  No weak pulses       Risk: 0

## 2021-09-01 ENCOUNTER — TELEPHONE (OUTPATIENT)
Dept: FAMILY MEDICINE CLINIC | Facility: CLINIC | Age: 61
End: 2021-09-01

## 2021-09-01 LAB
LEFT EYE DIABETIC RETINOPATHY: NORMAL
LEFT EYE IMAGE QUALITY: NORMAL
LEFT EYE MACULAR EDEMA: NORMAL
LEFT EYE OTHER RETINOPATHY: NORMAL
RIGHT EYE DIABETIC RETINOPATHY: NORMAL
RIGHT EYE IMAGE QUALITY: NORMAL
RIGHT EYE MACULAR EDEMA: NORMAL
RIGHT EYE OTHER RETINOPATHY: NORMAL
SEVERITY (EYE EXAM): NORMAL

## 2021-09-01 PROCEDURE — 2025F 7 FLD RTA PHOTO W/O RTNOPTHY: CPT | Performed by: FAMILY MEDICINE

## 2021-09-01 NOTE — TELEPHONE ENCOUNTER
Vanessa called and the Endo Dr Nichole Santos has no openings until Dec  She will be back in her country by then and will be without medication  What can be done? Can only be seen in [place]    Anette Hurd

## 2021-09-01 NOTE — TELEPHONE ENCOUNTER
Vanessa (daughter in law) returned my call  I relayed the message about Camilo's eye exam from Theresa Hurd

## 2021-09-01 NOTE — TELEPHONE ENCOUNTER
----- Message from Enrico Gonzalez sent at 9/1/2021  9:30 AM EDT -----  Your eye test done in our office is negative for any retinopathy in both of your eyes   But still you have to follow up with your eye doctor regularly as this test does not replace complete eye exam  RA Gonzalez

## 2021-09-03 NOTE — TELEPHONE ENCOUNTER
Can they call other endocrinologists in the area based on insurance coverage and see if anyone else has appointments available

## 2021-09-08 ENCOUNTER — CONSULT (OUTPATIENT)
Dept: ENDOCRINOLOGY | Facility: CLINIC | Age: 61
End: 2021-09-08
Payer: COMMERCIAL

## 2021-09-08 VITALS
HEART RATE: 83 BPM | WEIGHT: 170 LBS | HEIGHT: 60 IN | BODY MASS INDEX: 33.38 KG/M2 | SYSTOLIC BLOOD PRESSURE: 120 MMHG | TEMPERATURE: 97 F | DIASTOLIC BLOOD PRESSURE: 90 MMHG

## 2021-09-08 DIAGNOSIS — I10 ESSENTIAL HYPERTENSION: ICD-10-CM

## 2021-09-08 DIAGNOSIS — R79.89 ABNORMAL TSH: Primary | ICD-10-CM

## 2021-09-08 DIAGNOSIS — R51.9 INTRACTABLE HEADACHE, UNSPECIFIED CHRONICITY PATTERN, UNSPECIFIED HEADACHE TYPE: ICD-10-CM

## 2021-09-08 DIAGNOSIS — F41.9 ANXIETY: ICD-10-CM

## 2021-09-08 DIAGNOSIS — E83.52 HYPERCALCEMIA: ICD-10-CM

## 2021-09-08 DIAGNOSIS — R00.2 PALPITATIONS: ICD-10-CM

## 2021-09-08 DIAGNOSIS — E01.0 THYROMEGALY: ICD-10-CM

## 2021-09-08 DIAGNOSIS — R61 SWEATING INCREASE: ICD-10-CM

## 2021-09-08 PROCEDURE — 1036F TOBACCO NON-USER: CPT | Performed by: INTERNAL MEDICINE

## 2021-09-08 PROCEDURE — 99205 OFFICE O/P NEW HI 60 MIN: CPT | Performed by: INTERNAL MEDICINE

## 2021-09-08 NOTE — PATIENT INSTRUCTIONS
Please get labs done as ordered  Keep well hydrated   You are being referred for an ultrasound of the thyroid  Follow up in 1-2 months

## 2021-09-08 NOTE — PROGRESS NOTES
Cele Reyes 63 y o  female MRN: [id number]    Encounter: [id number]      Assessment/Plan     1  Suppressed TSH X 1  2  Thyromegaly  3  palpitations  4  Anxiety/ panic attacks   5  Headaches   6  Sweating   7  Hypertension  - repeat TSH along with free T3, free T4, TSI antibody   -check plasma catecholamines, metanephrines   -ultrasound thyroid to evaluate for thyroid nodules     Will decide further management based on results     8  Hypercalcemia x1   Repeat BMP, check PTH, vitamin-D    Follow-up in 1-2 months       CC:  Hyperthyroidism    History of Present Illness     HPI:  Cele Reyes is a 63 y o  female presents for evaluation of hyperthyroidism  Noted to have low TSH on recent labs     Last 7-8 months has been having t he following symptoms   c/o anxiety, palpitations, headaches, increased sweating   hyperventilating, cold sweats and after the sweating, she feels cold and numbness in the starts   Occurs in episodes, sometimes once in a few months, at other times times 2-3 times a week   Possibly some flushing of the face   To the family it seems like a panic attack     Change in appetite                            No  Jono loss/weight gain                       No  Change in Energy levels                    No  Heat intolerance/ cold intolerance      No  Shakes/ tremors           No  Diarrhea/constipation                          No  Difficulty in sleeping                            Yes   Hair loss                                              Yes   Dry eyes/FB sensation                        No  Eye pain                                               No   Tearing/redness/swellling                   No     Swelling in the neck: No  Obstructive symptoms: no trouble breathing, swallowing or hoarseness in voice     No h/o Iodine exposure  Family history of thyroid disease: sister had some thyroid disease   No known thyroid cancer     H/o hypertension x 7-8 years - on diovan   BP as been variable   H/o migraines x many years    Mildly elevated high calcium level on labs   Was taking calcium 1000 mg orally daily - has now stopped   No vitamin D supplements   1 cup milk, yogurt, no cheese        All other systems were reviewed and were negative    Review of Systems    Historical Information   Past Medical History:   Diagnosis Date    Type 2 diabetes mellitus ([facility] [address] )      Past Surgical History:   Procedure Laterality Date    CHOLECYSTECTOMY  2000     Social History   Social History     Substance and Sexual Activity   Alcohol Use Not Currently     Social History     Substance and Sexual Activity   Drug Use Never     Social History     Tobacco Use   Smoking Status Never Smoker   Smokeless Tobacco Never Used     Family History:   Family History   Problem Relation Age of Onset    Diabetes Mother     Heart attack Father     Diabetes Father     Heart attack Sister     Diabetes Sister        Meds/Allergies   Current Outpatient Medications   Medication Sig Dispense Refill    Blood Glucose Monitoring Suppl (ONE TOUCH ULTRA 2) w/Device KIT USE AS DIRECTED ONCE A DAY      Lancets (ONETOUCH DELICA PLUS [id number]) MISC daily Use as directed      metFORMIN (GLUCOPHAGE) 1000 MG tablet Take 1,000 mg by mouth 2 (two) times a day with meals      ONETOUCH ULTRA test strip daily      rosuvastatin (CRESTOR) 5 mg tablet Take 1 tablet (5 mg total) by mouth daily 90 tablet 1    valsartan (DIOVAN) 80 mg tablet Take 80 mg by mouth every morning       No current facility-administered medications for this visit  No Known Allergies    Objective   Vitals: Blood pressure 120/90, pulse 83, temperature (!) 97 °F (36 1 °C), height 5' (1 524 m), weight 77 1 kg (170 lb)  Physical Exam  Constitutional:       Appearance: She is well-developed  HENT:      Head: Normocephalic and atraumatic  Eyes:      Conjunctiva/sclera: Conjunctivae normal       Pupils: Pupils are equal, round, and reactive to light     Neck:      Thyroid: No thyromegaly  Comments: Enlarged thyroid bilaterally, appears lumpy/nodular however no distinct nodules could be appreciated  Lower limit of the thyroid could be felt  Nontender, not fixed to the overlying skin or surrounding structures  Cardiovascular:      Rate and Rhythm: Normal rate and regular rhythm  Heart sounds: Normal heart sounds  No murmur heard  Pulmonary:      Effort: Pulmonary effort is normal       Breath sounds: Normal breath sounds  No wheezing  Abdominal:      General: There is no distension  Palpations: Abdomen is soft  Tenderness: There is no abdominal tenderness  Musculoskeletal:         General: Normal range of motion  Cervical back: Normal range of motion and neck supple  Skin:     General: Skin is warm and dry  Neurological:      Mental Status: She is alert and oriented to person, place, and time  Deep Tendon Reflexes: Reflexes normal       Comments: No tremors on the outstretched arms     Psychiatric:         Behavior: Behavior normal          The history was obtained from the review of the chart, patient and family  Lab Results:      Lab Results   Component Value Date    TSH 0 057 (L) 08/26/2021        Component      Latest Ref Rng & Units 7/2/2020 8/26/2021   TSH, POC      0 450 - 4 500 uIU/mL 0 810 0 057 (L)     Lab Results   Component Value Date    BUN 11 08/26/2021    K 4 3 08/26/2021     08/26/2021    CO2 22 08/26/2021        No results found for: CALCIUM, PHOS     Imaging Studies:       I have personally reviewed pertinent reports  Portions of the record may have been created with voice recognition software  Occasional wrong word or "sound a like" substitutions may have occurred due to the inherent limitations of voice recognition software  Read the chart carefully and recognize, using context, where substitutions have occurred

## 2021-09-13 DIAGNOSIS — I10 ESSENTIAL HYPERTENSION: Primary | ICD-10-CM

## 2021-09-13 DIAGNOSIS — E55.9 VITAMIN D DEFICIENCY: Primary | ICD-10-CM

## 2021-09-13 PROCEDURE — 4010F ACE/ARB THERAPY RXD/TAKEN: CPT | Performed by: INTERNAL MEDICINE

## 2021-09-13 RX ORDER — VALSARTAN 80 MG/1
80 TABLET ORAL EVERY MORNING
Qty: 90 TABLET | Refills: 1 | Status: SHIPPED | OUTPATIENT
Start: 2021-09-13 | End: 2021-11-01 | Stop reason: SDUPTHER

## 2021-09-13 NOTE — TELEPHONE ENCOUNTER
Pt's daughter-in-law Vanessa called and requested lab results  And wanted to know based on labs will pt need to start any medications?    Nicholil can be reached at [phone]

## 2021-09-14 NOTE — TELEPHONE ENCOUNTER
Partial results available   Please call the lab and inquire if consults for plasma metanephrines are available for review     Has hyperthyroidism based on suppressed TSH, elevated free T4  TSI antibody negative  Recommend proceeding with the ultrasound of the neck thyroid which has already been ordered  If she has thyroid nodules, toxic nodule(s) could be the reason for hypothyroidism  Confirmation would be with a thyroid uptake scan  Depending on patient's preference, can start management presumptively or after confirmation  She will need regular blood work, if [place] is [place] for an extended period of time, will need to follow up with a doctor there  Vitamin-D deficiency-recommend starting vitamin D2 50,000 international units orally once a week for 8 weeks  Then start maintenance with vitamin D3 2000 international units orally daily which the patient can get over-the-counter    Repeat vitamin-D level in 3 months

## 2021-09-14 NOTE — TELEPHONE ENCOUNTER
Spoke with patient's daughter in law and reviewed lab results  She will do the scan    Ordered vitamin D2

## 2021-09-21 LAB
25(OH)D3+25(OH)D2 SERPL-MCNC: 13.3 NG/ML (ref 30–100)
BUN SERPL-MCNC: 13 MG/DL (ref 6–24)
BUN/CREAT SERPL: 20 (ref 9–23)
CALCIUM SERPL-MCNC: 9.9 MG/DL (ref 8.7–10.2)
CHLORIDE SERPL-SCNC: 103 MMOL/L (ref 96–106)
CO2 SERPL-SCNC: 23 MMOL/L (ref 20–29)
CREAT SERPL-MCNC: 0.66 MG/DL (ref 0.57–1)
DOPAMINE 24H UR-MRATE: <30 PG/ML (ref 0–48)
EPINEPH PLAS-MCNC: 29 PG/ML (ref 0–62)
GLUCOSE SERPL-MCNC: 106 MG/DL (ref 65–99)
METANEPH FREE SERPL-MCNC: 45.1 PG/ML (ref 0–88)
NOREPINEPH PLAS-MCNC: 180 PG/ML (ref 0–874)
NORMETANEPHRINE SERPL-MCNC: 61.4 PG/ML (ref 0–136.8)
POTASSIUM SERPL-SCNC: 4.6 MMOL/L (ref 3.5–5.2)
PTH-INTACT SERPL-MCNC: 26 PG/ML (ref 15–65)
SL AMB EGFR AFRICAN AMERICAN: 112 ML/MIN/1.73
SL AMB EGFR NON AFRICAN AMERICAN: 97 ML/MIN/1.73
SODIUM SERPL-SCNC: 140 MMOL/L (ref 134–144)
T3FREE SERPL-MCNC: 4.4 PG/ML (ref 2–4.4)
T4 FREE SERPL-MCNC: 1.97 NG/DL (ref 0.82–1.77)
TSH SERPL DL<=0.005 MIU/L-ACNC: <0.005 UIU/ML (ref 0.45–4.5)
TSI SER-ACNC: <0.1 IU/L (ref 0–0.55)

## 2021-09-23 ENCOUNTER — HOSPITAL ENCOUNTER (OUTPATIENT)
Dept: RADIOLOGY | Facility: HOSPITAL | Age: 61
Discharge: HOME | End: 2021-09-23
Attending: INTERNAL MEDICINE
Payer: COMMERCIAL

## 2021-09-23 DIAGNOSIS — R79.89 ABNORMAL TSH: ICD-10-CM

## 2021-09-23 DIAGNOSIS — R61 SWEATING INCREASE: ICD-10-CM

## 2021-09-23 DIAGNOSIS — E01.0 THYROMEGALY: ICD-10-CM

## 2021-09-23 DIAGNOSIS — R00.2 PALPITATIONS: ICD-10-CM

## 2021-09-23 DIAGNOSIS — R51.9 INTRACTABLE HEADACHE, UNSPECIFIED CHRONICITY PATTERN, UNSPECIFIED HEADACHE TYPE: ICD-10-CM

## 2021-09-23 DIAGNOSIS — F41.9 ANXIETY: ICD-10-CM

## 2021-09-23 PROCEDURE — 76536 US EXAM OF HEAD AND NECK: CPT

## 2021-09-27 ENCOUNTER — TELEPHONE (OUTPATIENT)
Dept: FAMILY MEDICINE CLINIC | Facility: CLINIC | Age: 61
End: 2021-09-27

## 2021-09-27 DIAGNOSIS — R51.9 NONINTRACTABLE HEADACHE, UNSPECIFIED CHRONICITY PATTERN, UNSPECIFIED HEADACHE TYPE: Primary | ICD-10-CM

## 2021-09-27 RX ORDER — BUTALBITAL, ACETAMINOPHEN AND CAFFEINE 300; 40; 50 MG/1; MG/1; MG/1
1 CAPSULE ORAL EVERY 4 HOURS PRN
Qty: 30 CAPSULE | Refills: 0 | Status: SHIPPED | OUTPATIENT
Start: 2021-09-27 | End: 2021-09-28

## 2021-09-27 NOTE — TELEPHONE ENCOUNTER
I sent a prescription medication- fioricet- to her pharmacy  Have her try this for headaches and let me know if it does not helps  Thank you

## 2021-09-27 NOTE — TELEPHONE ENCOUNTER
SON called back again  He tried going to the pharmacy to get the Fioricet and [facility]  Please see if there might be something else and call pt back

## 2021-09-27 NOTE — TELEPHONE ENCOUNTER
F/U migraines    Daughter calling stating that Shanique having migraines still and wants to know what the next step should be  Nothing is helping with headaches      Daughter chavoil [phone]

## 2021-09-28 ENCOUNTER — TELEPHONE (OUTPATIENT)
Dept: FAMILY MEDICINE CLINIC | Facility: CLINIC | Age: 61
End: 2021-09-28

## 2021-09-28 ENCOUNTER — TELEPHONE (OUTPATIENT)
Dept: ENDOCRINOLOGY | Facility: CLINIC | Age: 61
End: 2021-09-28

## 2021-09-28 DIAGNOSIS — R51.9 NONINTRACTABLE HEADACHE, UNSPECIFIED CHRONICITY PATTERN, UNSPECIFIED HEADACHE TYPE: Primary | ICD-10-CM

## 2021-09-28 RX ORDER — BUTALBITAL, ACETAMINOPHEN AND CAFFEINE 50; 325; 40 MG/1; MG/1; MG/1
1 TABLET ORAL EVERY 4 HOURS PRN
Qty: 30 TABLET | Refills: 0 | Status: SHIPPED | OUTPATIENT
Start: 2021-09-28

## 2021-09-28 NOTE — TELEPHONE ENCOUNTER
There is another message regarding same medication  See other message for details   No further action required [facility]

## 2021-09-28 NOTE — TELEPHONE ENCOUNTER
Please call inform patient of results  Has multiple thyroid nodules bilaterally, some meet criteria for biopsy  Once the patient has had the nuclear medicine thyroid uptake and scan, will decide which all nodules need to be biopsied

## 2021-09-28 NOTE — TELEPHONE ENCOUNTER
Butalbital-APAP-Caffeine -40mg tablets are covered    Please reorder Thanks  [employer] aware  [email or web address]

## 2021-09-30 ENCOUNTER — OFFICE VISIT (OUTPATIENT)
Dept: CARDIOLOGY CLINIC | Facility: CLINIC | Age: 61
End: 2021-09-30
Payer: COMMERCIAL

## 2021-09-30 VITALS
BODY MASS INDEX: 33.18 KG/M2 | DIASTOLIC BLOOD PRESSURE: 80 MMHG | TEMPERATURE: 98.6 F | SYSTOLIC BLOOD PRESSURE: 110 MMHG | WEIGHT: 169 LBS | HEART RATE: 78 BPM | OXYGEN SATURATION: 98 % | HEIGHT: 60 IN

## 2021-09-30 DIAGNOSIS — Q24.5 ANOMALOUS ORIGIN OF RIGHT CORONARY ARTERY: Primary | ICD-10-CM

## 2021-09-30 PROCEDURE — 3008F BODY MASS INDEX DOCD: CPT | Performed by: INTERNAL MEDICINE

## 2021-09-30 PROCEDURE — 99214 OFFICE O/P EST MOD 30 MIN: CPT | Performed by: INTERNAL MEDICINE

## 2021-09-30 NOTE — PROGRESS NOTES
Progress Note - Cardiology Office  [facility]    Christina 63 y o  female MRN: [id number]  : 1960  Encounter: [id number]      ASSESSMENT:   Hypertension  BP today is 110/80 mm Hg     TTE, 2020:  EF 55-60%, G1DD     Diabetes mellitus, T2     Dyslipidemia  2021:  , triglycerides 172  Now on Crestor 5 mg daily    Obesity:  BMI is 33 20    Family history of CAD    Sister  possibly of an MI     Dyspnea on exertion     Anxiety disorder     Abnormal EKG  Inverted T-waves in lateral precordial leads     Coronary calcium score, 2020:  18     CCTA, 2020:      IMPRESSION:    non obstructive 0-24% plaque involving proximal LAD, proximal circumflex and diagonal branch    The coronary artery calcium score is 18     The right coronary arises from the left sinus of valsalva and travels anterior between that aortic root and PA  The vessel arises at a sharp angle and there is no intra arterial course  The proximal vessel appears compressed         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  RCA travels anterior between the aortic root and PA   it arises at a sharp angle and there is no intra-arterial course  The proximal vessel appears compressed    Patient was seen by Dr Cristian Connolly who called and informed me that he is recommending surgery to correct the origin of the RCA        RECOMMENDATIONS:  Again advised patient to regularly check her blood pressure in the morning before taking her antihypertensive medications and to hold blood pressure med since if systolic BP is less than 786    Bring blood pressure diary at next office visit    Dr Cristian Connolly , [address] surgeon at [facility] has recommended surgical repositioning of an embolus RCA origin    Patient however is scared of having the surgery and therefore still undecided  Patient and her daughter-in-law were explained in detail the risk with surgery and the risk of not having the surgery            Please call [phone] if any questions  HPI :     Vinod Noel is a 63y o  year old female who came for follow up  Patient has anomalous origin of RCA with possible compression between aorta and PA and was seen by cardiothoracic surgeon who has recommended transplantation of the RCA origin      REVIEW OF SYSTEMS:  Denies chest pain, unusual dyspnea, palpitations or syncope    Historical Information   Past Medical History:   Diagnosis Date    Type 2 diabetes mellitus ([facility] [address] )      Past Surgical History:   Procedure Laterality Date    CHOLECYSTECTOMY  2000     Social History     Substance and Sexual Activity   Alcohol Use Not Currently     Social History     Substance and Sexual Activity   Drug Use Never     Social History     Tobacco Use   Smoking Status Never Smoker   Smokeless Tobacco Never Used     Family History:   Family History   Problem Relation Age of Onset    Diabetes Mother     Heart attack Father     Diabetes Father     Heart attack Sister     Diabetes Sister        Meds/Allergies     No Known Allergies    Current Outpatient Medications:     Blood Glucose Monitoring Suppl (ONE TOUCH ULTRA 2) w/Device KIT, USE AS DIRECTED ONCE A DAY, Disp: , Rfl:     butalbital-acetaminophen-caffeine (Bac) -40 mg per tablet, Take 1 tablet by mouth every 4 (four) hours as needed for headaches, Disp: 30 tablet, Rfl: 0    Cholecalciferol 1 25 MG (48534 UT) capsule, Take one capsule by mouth once a week for 8 weeks, Disp: 8 capsule, Rfl: 0    Lancets (ONETOUCH DELICA PLUS [id number]) MISC, daily Use as directed, Disp: , Rfl:     metFORMIN (GLUCOPHAGE) 1000 MG tablet, Take 1,000 mg by mouth 2 (two) times a day with meals, Disp: , Rfl:     ONETOUCH ULTRA test strip, daily, Disp: , Rfl:     rosuvastatin (CRESTOR) 5 mg tablet, Take 1 tablet (5 mg total) by mouth daily, Disp: 90 tablet, Rfl: 1    valsartan (DIOVAN) 80 mg tablet, Take 1 tablet (80 mg total) by mouth every morning, Disp: 90 tablet, Rfl: 1    Vitals:   /80 mmHg  HR 78/minute  BP Readings from Last 3 Encounters:   21 120/90   21 128/84   21 90/60       Physical Exam:    Neurologic:  Alert & oriented x 3, no new focal deficits, Not in any acute distress,  Constitutional:  Obese  Eyes:  Pupil equal and reacting to light, conjunctiva normal,   HENT:  Atraumatic, oropharynx moist, Neck- normal range of motion, no tenderness,  Neck supple, No JVP, No LNP   Respiratory:  Bilateral air entry, mostly clear to auscultation  Cardiovascular: S1-S2 regular with a I/VI ejection systolic murmur   GI:  Soft, nondistended, normal bowel sounds, nontender, no hepatosplenomegaly appreciated  Musculoskeletal:  No edema, no tenderness, no deformities  Skin:  Well hydrated, no rash   Lymphatic:  No lymphadenopathy noted   Extremities:  No edema and distal pulses are present          Cardiac testing:   Results for orders placed during the hospital encounter of 20    Echo complete with contrast if indicated    Narrative  [address]  [address] [facility] [address]  [phone]    Transthoracic Echocardiogram  2D, M-mode, Doppler, and Color Doppler    Study date:  2020    Patient: Maricruz Irving  MR number: [id number]  Account number: [de-identified]  : 1960  Age: 64 years  Gender: Female  Status: Outpatient  Location: Echo lab  Height: 65 in  Weight: 179 5 lb  BP: 121/ 85 mmHg    Indications: Abnormal EKG    Diagnoses: R06 00 - Dyspnea, unspecified    Sonographer:  KHALIDA Bunn  Primary Physician:  Jose Elias Fernando MD  Referring Physician:  Harish Bennett MD  Group:  [facility]  Interpreting Physician:  Hesham Barrera DO    SUMMARY    LEFT VENTRICLE:  Systolic function was normal by visual assessment  Ejection fraction was estimated in the range of 55 % to 60 %  There were no regional wall motion abnormalities    Wall thickness was at the upper limits of normal   Doppler parameters were consistent with abnormal left ventricular relaxation (grade 1 diastolic dysfunction)  AORTIC VALVE:  There was no evidence for stenosis  There was no significant regurgitation  TRICUSPID VALVE:  There was mild regurgitation  Pulmonary artery systolic pressure was within the normal range  HISTORY: PRIOR HISTORY: HTN,Dyslipidemia,Diabetes,Abnormal EKG  PROCEDURE: The procedure was performed in the echo lab  This was a routine study  The transthoracic approach was used  The study included complete 2D imaging, M-mode, complete spectral Doppler, and color Doppler  The heart rate was 73 bpm,  at the start of the study  Images were obtained from the parasternal, apical, subcostal, and suprasternal notch acoustic windows  Image quality was adequate  LEFT VENTRICLE: Size was normal  Systolic function was normal by visual assessment  Ejection fraction was estimated in the range of 55 % to 60 %  There were no regional wall motion abnormalities  Wall thickness was at the upper limits of  normal  No evidence of apical thrombus  DOPPLER: Doppler parameters were consistent with abnormal left ventricular relaxation (grade 1 diastolic dysfunction)  RIGHT VENTRICLE: The size was normal  Systolic function was normal  Wall thickness was normal     LEFT ATRIUM: Size was normal     RIGHT ATRIUM: Size was normal     MITRAL VALVE: Valve structure was normal  There was normal leaflet separation  DOPPLER: The transmitral velocity was within the normal range  There was no evidence for stenosis  There was no significant regurgitation  AORTIC VALVE: The valve was trileaflet  Leaflets exhibited normal thickness, normal cuspal separation, and sclerosis  DOPPLER: Transaortic velocity was within the normal range  There was no evidence for stenosis  There was no significant  regurgitation  TRICUSPID VALVE: The valve structure was normal  There was normal leaflet separation   DOPPLER: The transtricuspid velocity was within the normal range  There was no evidence for stenosis  There was mild regurgitation  Pulmonary artery  systolic pressure was within the normal range  Estimated peak PA pressure was 25 mmHg  PULMONIC VALVE: Leaflets exhibited normal thickness, no calcification, and normal cuspal separation  DOPPLER: The transpulmonic velocity was within the normal range  There was no significant regurgitation  PERICARDIUM: There was no pericardial effusion  The pericardium was normal in appearance  AORTA: The root exhibited normal size  SYSTEMIC VEINS: IVC: The inferior vena cava was normal in size  SYSTEM MEASUREMENT TABLES    2D mode  AoR Diam 2D: 2 9 cm  LA Diam (2D): 3 1 cm  LA/Ao (2D): 1 07  FS (2D Teich): 27 8 %  IVSd (2D): 0 83 cm  LVDEV: 90 5 cmï¾³  LVESV: 41 6 cmï¾³  LVIDd(2D): 4 46 cm  LVISd (2D): 3 22 cm  LVOT Area 2D: 3 14 cmï¾²  LVPWd (2D): 1 05 cm  SV (Teich): 48 9 cmï¾³    Apical four chamber  LVEDV MOD A4C: 83 cmï¾³  LVEF A4C: 54 %  LVLD A4C: 6 71 cm    Unspecified Scan Mode  AMBER Cont Eq (Peak Power): 1 89 cmï¾²  LVOT Diam : 2 cm  LVOT Vmax: 923 mm/s  LVOT Vmax; Mean: 923 mm/s  Peak Grad ; Mean: 3 mm[Hg]  MV Peak A Power: 923 mm/s  MV Peak E Power  Mean: 587 mm/s  MVA (PHT): 3 1 cmï¾²  PHT: 71 ms  Max P mm[Hg]  V Max: 2050 mm/s  Vmax: 2050 mm/s  RA Area: 15 9 cmï¾²  RA Volume: 42 8 cmï¾³  TAPSE: 1 7 cm    [employer] Accredited Echocardiography Laboratory    Prepared and electronically signed by    Yrn Cortes DO  Signed 2020 14:30:10      Imaging:  Chest X-Ray:   No Chest XR results available for this patient  CT-scan of the chest:     No CTA results available for this patient    Lab Review   Lab Results   Component Value Date    WBC 7 2 2021    HGB 14 2 2021    HCT 42 5 2021    MCV 81 2021    RDW 12 7 2021     2021     BMP:  Lab Results   Component Value Date    SODIUM 140 2021    K 4 6 2021  09/09/2021    CO2 23 09/09/2021    BUN 13 09/09/2021    CREATININE 0 66 09/09/2021    GLUC 106 (H) 09/09/2021     LFT:  Lab Results   Component Value Date    AST 23 08/26/2021    ALT 18 08/26/2021    TP 7 3 08/26/2021    ALB 4 5 08/26/2021      No components found for: TSH3  No results found for: [facility]  Lab Results   Component Value Date    HGBA1C 6 3 (H) 08/26/2021     Lipid Profile:   Lab Results   Component Value Date    CHOLESTEROL 208 (H) 08/26/2021    HDL 52 08/26/2021    LDLCALC 126 (H) 08/26/2021    TRIG 172 (H) 08/26/2021     Lab Results   Component Value Date    CHOLESTEROL 208 (H) 08/26/2021    CHOLESTEROL 222 (H) 07/02/2020     No results found for: CKTOTAL, CKMB, CKMBINDEX, TROPONINI  No results found for: NTBNP   Recent Results (from the past 672 hour(s))   Basic metabolic panel    Collection Time: 09/09/21  9:52 AM   Result Value Ref Range    Glucose, Random 106 (H) 65 - 99 mg/dL    BUN 13 6 - 24 mg/dL    Creatinine 0 66 0 57 - 1 00 mg/dL    eGFR Non African American 97 >59 mL/min/1 73    eGFR  112 >59 mL/min/1 73    SL AMB BUN/CREATININE RATIO 20 9 - 23    Sodium 140 134 - 144 mmol/L    Potassium 4 6 3 5 - 5 2 mmol/L    Chloride 103 96 - 106 mmol/L    CO2 23 20 - 29 mmol/L    CALCIUM 9 9 8 7 - 10 2 mg/dL   Catecholamines, fractionated, plasma    Collection Time: 09/09/21  9:52 AM   Result Value Ref Range    Norepinephrine Plasma 180 0 - 874 pg/mL    Epinephrine Plasma 29 0 - 62 pg/mL    Dopamine Plasma <30 0 - 48 pg/mL   Metanephrine, Fractionated Plasma Free    Collection Time: 09/09/21  9:52 AM   Result Value Ref Range    Normetanephrine, Free 61 4 0 0 - 136 8 pg/mL    Metanephrine, Free 45 1 0 0 - 88 0 pg/mL   T4, free    Collection Time: 09/09/21  9:52 AM   Result Value Ref Range    Free t4 1 97 (H) 0 82 - 1 77 ng/dL   TSH, 3rd generation    Collection Time: 09/09/21  9:52 AM   Result Value Ref Range    TSH <0 005 (L) 0 450 - 4 500 uIU/mL   Vitamin D 25 hydroxy Collection Time: 09/09/21  9:52 AM   Result Value Ref Range    25-HYDROXY VIT D 13 3 (L) 30 0 - 100 0 ng/mL   Thyroid stimulating immunoglobulin    Collection Time: 09/09/21  9:52 AM   Result Value Ref Range    TSI <0 10 0 00 - 0 55 IU/L   T3, free    Collection Time: 09/09/21  9:52 AM   Result Value Ref Range    Free T3 4 4 2 0 - 4 4 pg/mL   PTH, intact    Collection Time: 09/09/21  9:52 AM   Result Value Ref Range    PTH, Intact 26 15 - 65 pg/mL             Dr Kendrick Ye MD, [facility] [place]      "This note has been constructed using a voice recognition system  Therefore there may be syntax, spelling, and/or grammatical errors   Please call if you have any questions  "

## 2021-10-04 ENCOUNTER — HOSPITAL ENCOUNTER (OUTPATIENT)
Dept: RADIOLOGY | Facility: HOSPITAL | Age: 61
Discharge: HOME | End: 2021-10-04
Attending: INTERNAL MEDICINE
Payer: COMMERCIAL

## 2021-10-04 DIAGNOSIS — E05.90 HYPERTHYROIDISM: ICD-10-CM

## 2021-10-04 PROCEDURE — A9516 IODINE I-123 SOD IODIDE MIC: HCPCS

## 2021-10-04 PROCEDURE — G1004 CDSM NDSC: HCPCS

## 2021-10-04 PROCEDURE — 78014 THYROID IMAGING W/BLOOD FLOW: CPT

## 2021-10-05 ENCOUNTER — TELEPHONE (OUTPATIENT)
Dept: FAMILY MEDICINE CLINIC | Facility: CLINIC | Age: 61
End: 2021-10-05

## 2021-10-05 ENCOUNTER — HOSPITAL ENCOUNTER (OUTPATIENT)
Dept: RADIOLOGY | Facility: HOSPITAL | Age: 61
Discharge: HOME | End: 2021-10-05
Attending: INTERNAL MEDICINE
Payer: COMMERCIAL

## 2021-10-05 DIAGNOSIS — E11.9 TYPE 2 DIABETES MELLITUS WITHOUT COMPLICATION, WITHOUT LONG-TERM CURRENT USE OF INSULIN (HCC): Primary | ICD-10-CM

## 2021-10-06 RX ORDER — BLOOD SUGAR DIAGNOSTIC
1 STRIP MISCELLANEOUS DAILY
Qty: 200 EACH | Refills: 3 | Status: SHIPPED | OUTPATIENT
Start: 2021-10-06

## 2021-10-08 ENCOUNTER — TELEPHONE (OUTPATIENT)
Dept: ENDOCRINOLOGY | Facility: CLINIC | Age: 61
End: 2021-10-08

## 2021-10-12 ENCOUNTER — TELEMEDICINE (OUTPATIENT)
Dept: ENDOCRINOLOGY | Facility: CLINIC | Age: 61
End: 2021-10-12
Payer: COMMERCIAL

## 2021-10-12 DIAGNOSIS — I10 ESSENTIAL HYPERTENSION: ICD-10-CM

## 2021-10-12 DIAGNOSIS — E04.2 MULTIPLE THYROID NODULES: ICD-10-CM

## 2021-10-12 DIAGNOSIS — E05.90 HYPERTHYROIDISM: Primary | ICD-10-CM

## 2021-10-12 PROCEDURE — 99214 OFFICE O/P EST MOD 30 MIN: CPT | Performed by: INTERNAL MEDICINE

## 2021-10-13 LAB
T3 SERPL-MCNC: 133 NG/DL (ref 71–180)
T4 FREE SERPL-MCNC: 1.89 NG/DL (ref 0.82–1.77)
TSH SERPL DL<=0.005 MIU/L-ACNC: <0.005 UIU/ML (ref 0.45–4.5)

## 2021-10-14 ENCOUNTER — TELEPHONE (OUTPATIENT)
Dept: ENDOCRINOLOGY | Facility: CLINIC | Age: 61
End: 2021-10-14

## 2021-10-18 ENCOUNTER — TELEPHONE (OUTPATIENT)
Dept: FAMILY MEDICINE CLINIC | Facility: CLINIC | Age: 61
End: 2021-10-18

## 2021-10-22 ENCOUNTER — TELEPHONE (OUTPATIENT)
Dept: FAMILY MEDICINE CLINIC | Facility: CLINIC | Age: 61
End: 2021-10-22

## 2021-10-22 DIAGNOSIS — F41.9 ANXIETY: Primary | ICD-10-CM

## 2021-10-22 RX ORDER — HYDROXYZINE HYDROCHLORIDE 25 MG/1
25 TABLET, FILM COATED ORAL EVERY 6 HOURS PRN
Qty: 30 TABLET | Refills: 0 | Status: SHIPPED | OUTPATIENT
Start: 2021-10-22

## 2021-11-01 ENCOUNTER — OFFICE VISIT (OUTPATIENT)
Dept: FAMILY MEDICINE CLINIC | Facility: CLINIC | Age: 61
End: 2021-11-01
Payer: COMMERCIAL

## 2021-11-01 VITALS
HEART RATE: 93 BPM | RESPIRATION RATE: 14 BRPM | OXYGEN SATURATION: 97 % | WEIGHT: 166 LBS | BODY MASS INDEX: 32.59 KG/M2 | SYSTOLIC BLOOD PRESSURE: 124 MMHG | DIASTOLIC BLOOD PRESSURE: 78 MMHG | TEMPERATURE: 97.6 F | HEIGHT: 60 IN

## 2021-11-01 DIAGNOSIS — E78.5 DYSLIPIDEMIA: ICD-10-CM

## 2021-11-01 DIAGNOSIS — E11.9 TYPE 2 DIABETES MELLITUS WITHOUT COMPLICATION, WITHOUT LONG-TERM CURRENT USE OF INSULIN (HCC): Primary | ICD-10-CM

## 2021-11-01 DIAGNOSIS — I10 ESSENTIAL HYPERTENSION: ICD-10-CM

## 2021-11-01 PROCEDURE — 3078F DIAST BP <80 MM HG: CPT | Performed by: FAMILY MEDICINE

## 2021-11-01 PROCEDURE — 1036F TOBACCO NON-USER: CPT | Performed by: FAMILY MEDICINE

## 2021-11-01 PROCEDURE — 3008F BODY MASS INDEX DOCD: CPT | Performed by: FAMILY MEDICINE

## 2021-11-01 PROCEDURE — 4010F ACE/ARB THERAPY RXD/TAKEN: CPT | Performed by: FAMILY MEDICINE

## 2021-11-01 PROCEDURE — 3074F SYST BP LT 130 MM HG: CPT | Performed by: FAMILY MEDICINE

## 2021-11-01 PROCEDURE — 99214 OFFICE O/P EST MOD 30 MIN: CPT | Performed by: FAMILY MEDICINE

## 2021-11-01 RX ORDER — ROSUVASTATIN CALCIUM 5 MG/1
5 TABLET, COATED ORAL DAILY
Qty: 90 TABLET | Refills: 1 | Status: SHIPPED | OUTPATIENT
Start: 2021-11-01

## 2021-11-01 RX ORDER — VALSARTAN 80 MG/1
80 TABLET ORAL EVERY MORNING
Qty: 90 TABLET | Refills: 1 | Status: SHIPPED | OUTPATIENT
Start: 2021-11-01

## 2021-11-02 LAB
T3 SERPL-MCNC: 128 NG/DL (ref 71–180)
T4 FREE SERPL-MCNC: 1.81 NG/DL (ref 0.82–1.77)
TSH SERPL DL<=0.005 MIU/L-ACNC: <0.005 UIU/ML (ref 0.45–4.5)

## 2021-11-06 LAB
THYROGLOB AB SERPL-ACNC: <1 IU/ML (ref 0–0.9)
THYROGLOB SERPL-MCNC: 10.1 NG/ML (ref 1.5–38.5)
THYROPEROXIDASE AB SERPL-ACNC: >600 IU/ML (ref 0–34)

## 2021-11-08 ENCOUNTER — HOSPITAL ENCOUNTER (OUTPATIENT)
Dept: RADIOLOGY | Facility: HOSPITAL | Age: 61
Discharge: HOME | End: 2021-11-08
Attending: INTERNAL MEDICINE | Admitting: INTERNAL MEDICINE
Payer: COMMERCIAL

## 2021-11-08 DIAGNOSIS — E05.90 HYPERTHYROIDISM: ICD-10-CM

## 2021-11-08 PROCEDURE — 10005 FNA BX W/US GDN 1ST LES: CPT

## 2021-11-08 PROCEDURE — 88173 CYTOPATH EVAL FNA REPORT: CPT | Performed by: PATHOLOGY

## 2021-11-08 RX ORDER — LIDOCAINE HYDROCHLORIDE 10 MG/ML
10 INJECTION, SOLUTION EPIDURAL; INFILTRATION; INTRACAUDAL; PERINEURAL ONCE
Status: COMPLETED | OUTPATIENT
Start: 2021-11-08 | End: 2021-11-08

## 2021-11-08 RX ADMIN — LIDOCAINE HYDROCHLORIDE 5 ML: 10 INJECTION, SOLUTION EPIDURAL; INFILTRATION; INTRACAUDAL; PERINEURAL at 09:21

## 2021-11-09 ENCOUNTER — TELEPHONE (OUTPATIENT)
Dept: ENDOCRINOLOGY | Facility: CLINIC | Age: 61
End: 2021-11-09

## 2021-11-09 ENCOUNTER — TELEPHONE (OUTPATIENT)
Dept: OTHER | Facility: HOSPITAL | Age: 61
End: 2021-11-09

## 2021-11-09 DIAGNOSIS — E04.2 MULTIPLE THYROID NODULES: Primary | ICD-10-CM

## 2021-11-09 DIAGNOSIS — E05.90 HYPERTHYROIDISM: ICD-10-CM

## 2022-12-17 ENCOUNTER — HOSPITAL ENCOUNTER (EMERGENCY)
Facility: HOSPITAL | Age: 62
Discharge: HOME | End: 2022-12-17
Attending: EMERGENCY MEDICINE

## 2022-12-17 ENCOUNTER — APPOINTMENT (EMERGENCY)
Dept: RADIOLOGY | Facility: HOSPITAL | Age: 62
End: 2022-12-17

## 2022-12-17 VITALS
RESPIRATION RATE: 20 BRPM | DIASTOLIC BLOOD PRESSURE: 76 MMHG | WEIGHT: 173 LBS | BODY MASS INDEX: 33.79 KG/M2 | HEART RATE: 88 BPM | SYSTOLIC BLOOD PRESSURE: 165 MMHG | OXYGEN SATURATION: 100 % | TEMPERATURE: 98.7 F

## 2022-12-17 DIAGNOSIS — M17.12 DEGENERATIVE JOINT DISEASE OF LEFT KNEE: ICD-10-CM

## 2022-12-17 DIAGNOSIS — S83.92XA LEFT KNEE SPRAIN: Primary | ICD-10-CM

## 2022-12-17 RX ORDER — ACETAMINOPHEN 325 MG/1
650 TABLET ORAL ONCE
Status: COMPLETED | OUTPATIENT
Start: 2022-12-17 | End: 2022-12-17

## 2022-12-17 RX ADMIN — ACETAMINOPHEN 650 MG: 325 TABLET, FILM COATED ORAL at 19:31

## 2022-12-18 NOTE — DISCHARGE INSTRUCTIONS
Return to the ER for further concerns or worsening symptoms  Follow up with your primary care physician and orthopedics in 1-2 days  Keep knee immobilizer in place when awake  Continue Tylenol for pain relief

## 2022-12-18 NOTE — ED PROVIDER NOTES
History  Chief Complaint   Patient presents with   • Knee Swelling     Daughter in law translates  Patient started a week ago with pain behind  left knee and front of knee that radiates up and down leg with swelling  Patient is a 68-year-old female with a history of CAD, hypertension, diabetes that presents to the emergency department with complaint of left knee pain  She noticed symptoms shortly upon waking up a few days ago when she twisted her knee  She denies other trauma  Patient has since noticed swelling to the affected knee  No relief with Tylenol, recently taken this morning  History provided by:  Patient   used: No        Prior to Admission Medications   Prescriptions Last Dose Informant Patient Reported? Taking?    Blood Glucose Monitoring Suppl (ONE TOUCH ULTRA 2) w/Device KIT   Yes No   Sig: USE AS DIRECTED ONCE A DAY   Cholecalciferol 1 25 MG (64368 UT) capsule   No No   Sig: Take one capsule by mouth once a week for 8 weeks   Lancets (ONETOUCH DELICA PLUS [id number]) MISC   Yes No   Sig: daily Use as directed   OneTouch Ultra test strip   No No   Sig: Use 1 each daily   butalbital-acetaminophen-caffeine (Bac) -40 mg per tablet   No No   Sig: Take 1 tablet by mouth every 4 (four) hours as needed for headaches   hydrOXYzine HCL (ATARAX) 25 mg tablet   No No   Sig: Take 1 tablet (25 mg total) by mouth every 6 (six) hours as needed for anxiety   metFORMIN (GLUCOPHAGE) 1000 MG tablet   No No   Sig: Take 1 tablet (1,000 mg total) by mouth 2 (two) times a day with meals   rosuvastatin (CRESTOR) 5 mg tablet   No No   Sig: Take 1 tablet (5 mg total) by mouth daily   valsartan (DIOVAN) 80 mg tablet   No No   Sig: Take 1 tablet (80 mg total) by mouth every morning      Facility-Administered Medications: None       Past Medical History:   Diagnosis Date   • Coronary artery disease    • Hypertension    • Type 2 diabetes mellitus ([facility] [address] )        Past Surgical History:   Procedure Laterality Date   • CHOLECYSTECTOMY  2000   • US GUIDED THYROID BIOPSY  11/8/2021       Family History   Problem Relation Age of Onset   • Diabetes Mother    • Heart attack Father    • Diabetes Father    • Heart attack Sister    • Diabetes Sister      I have reviewed and agree with the history as documented  E-Cigarette/Vaping   • E-Cigarette Use Never User      E-Cigarette/Vaping Substances   • Nicotine No    • THC No    • CBD No    • Flavoring No    • Other No    • Unknown No      Social History     Tobacco Use   • Smoking status: Never   • Smokeless tobacco: Never   Vaping Use   • Vaping Use: Never used   Substance Use Topics   • Alcohol use: Not Currently   • Drug use: Never       Review of Systems   Constitutional: Negative for chills and fever  Respiratory: Negative for cough, chest tightness and shortness of breath  Gastrointestinal: Negative for abdominal pain, diarrhea, nausea and vomiting  Genitourinary: Negative for dysuria, frequency, hematuria and urgency  Musculoskeletal: Positive for gait problem and joint swelling  Negative for back pain, neck pain and neck stiffness  Skin: Negative for color change, pallor, rash and wound  All other systems reviewed and are negative  Physical Exam  Physical Exam  Vitals and nursing note reviewed  Constitutional:       General: She is not in acute distress  Appearance: She is well-developed  She is not diaphoretic  HENT:      Head: Normocephalic and atraumatic  Eyes:      Conjunctiva/sclera: Conjunctivae normal       Pupils: Pupils are equal, round, and reactive to light  Cardiovascular:      Heart sounds: Murmur heard  Pulmonary:      Effort: Pulmonary effort is normal  No respiratory distress  Musculoskeletal:         General: Swelling, tenderness and signs of injury present  No deformity  Normal range of motion  Cervical back: Normal range of motion and neck supple  Right lower leg: No edema        Left lower leg: No edema    Skin:     General: Skin is warm and dry  Capillary Refill: Capillary refill takes less than 2 seconds  Coloration: Skin is not pale  Findings: No rash  Neurological:      General: No focal deficit present  Mental Status: She is alert and oriented to person, place, and time  Cranial Nerves: No cranial nerve deficit  Psychiatric:         Behavior: Behavior normal          Vital Signs  ED Triage Vitals   Temperature Pulse Respirations Blood Pressure SpO2   12/17/22 1740 12/17/22 1740 12/17/22 1740 12/17/22 1740 12/17/22 1740   98 7 °F (37 1 °C) 88 20 165/76 100 %      Temp Source Heart Rate Source Patient Position - Orthostatic VS BP Location FiO2 (%)   12/17/22 1740 12/17/22 1740 12/17/22 1740 12/17/22 1740 --   Tympanic Monitor Sitting Left arm       Pain Score       12/17/22 1741       8           Vitals:    12/17/22 1740   BP: 165/76   Pulse: 88   Patient Position - Orthostatic VS: Sitting         Visual Acuity      ED Medications  Medications   acetaminophen (TYLENOL) tablet 650 mg (650 mg Oral Given 12/17/22 1931)       Diagnostic Studies  Results Reviewed     None                 XR knee 4+ vw left injury    (Results Pending)              Procedures  Orthopedic injury treatment    Date/Time: 12/17/2022 8:15 PM  Performed by: Neha Haider DO  Authorized by: Neha Haider DO     Patient Location:  ED  [place] Protocol:  Procedure performed by:  Consent: Verbal consent obtained  Risks and benefits: risks, benefits and alternatives were discussed  Consent given by: patient and guardian  Time out: Immediately prior to procedure a "time out" was called to verify the correct patient, procedure, equipment, support staff and site/side marked as required    Timeout called at: 12/17/2022 8:15 PM   Patient understanding: patient states understanding of the procedure being performed  Patient consent: the patient's understanding of the procedure matches consent given  Required items: required blood products, implants, devices, and special equipment available  Patient identity confirmed: verbally with patient      Injury location:  Knee  Location details:  Left knee  Injury type: Soft tissue  Neurovascular status: Neurovascularly intact    Distal perfusion: normal    Neurological function: normal    Range of motion: reduced    Local anesthesia used?: No    Skeletal traction used?: No    Immobilization:  Knee immobilizer  Supplies used:  Aluminum splint  Neurovascular status: Neurovascularly intact    Distal perfusion: normal    Neurological function: normal    Range of motion: unchanged    Patient tolerance:  Patient tolerated the procedure well with no immediate complications             ED Course                                             MDM  Number of Diagnoses or Management Options  Degenerative joint disease of left knee: new and requires workup  Left knee sprain: new and requires workup     Amount and/or Complexity of Data Reviewed  Tests in the radiology section of CPT®: ordered and reviewed    Risk of Complications, Morbidity, and/or Mortality  Presenting problems: high  Diagnostic procedures: high  Management options: high    Patient Progress  Patient progress: improved      Disposition  Final diagnoses:   Left knee sprain   Degenerative joint disease of left knee     Time reflects when diagnosis was documented in both MDM as applicable and the Disposition within this note     Time User Action Codes Description Comment    12/17/2022  8:26 PM Shannan Rodriguez  92XA] Left knee sprain     12/17/2022  8:26 PM Shannan Vargas Add [M17 12] Degenerative joint disease of left knee       ED Disposition     ED Disposition   Discharge    Condition   Stable    Date/Time   Sat Dec 17, 2022  8:26 PM    Comment   Joan Douglas discharge to home/self care                 Follow-up Information     Follow up With Specialties Details Why Contact Info Additional Information    Andrew Hebert MD Family Medicine Schedule an appointment as soon as possible for a visit in 2 days for follow up [address] [facility] [address]       [address] [facility] Orthopedic Surgery Schedule an appointment as soon as possible for a visit in 1 day for follow up [address], [address] [address] Specialists [place],  [address], [facility], [address] [phone]          Discharge Medication List as of 12/17/2022  8:27 PM      CONTINUE these medications which have NOT CHANGED    Details   Blood Glucose Monitoring Suppl (ONE TOUCH ULTRA 2) w/Device KIT USE AS DIRECTED ONCE A DAY, Historical Med      butalbital-acetaminophen-caffeine (Bac) -40 mg per tablet Take 1 tablet by mouth every 4 (four) hours as needed for headaches, Starting Tue 9/28/2021, Normal      Cholecalciferol 1 25 MG (21728 UT) capsule Take one capsule by mouth once a week for 8 weeks, Normal      hydrOXYzine HCL (ATARAX) 25 mg tablet Take 1 tablet (25 mg total) by mouth every 6 (six) hours as needed for anxiety, Starting Fri 10/22/2021, Normal      Lancets (ONETOUCH DELICA PLUS [id number]) MISC daily Use as directed, Starting Thu 6/25/2020, Historical Med      metFORMIN (GLUCOPHAGE) 1000 MG tablet Take 1 tablet (1,000 mg total) by mouth 2 (two) times a day with meals, Starting Mon 11/1/2021, Until Sun 1/30/2022, Normal      OneTouch Ultra test strip Use 1 each daily, Starting Wed 10/6/2021, Normal      rosuvastatin (CRESTOR) 5 mg tablet Take 1 tablet (5 mg total) by mouth daily, Starting Mon 11/1/2021, Normal      valsartan (DIOVAN) 80 mg tablet Take 1 tablet (80 mg total) by mouth every morning, Starting Mon 11/1/2021, Normal             No discharge procedures on file      PDMP Review     None          ED Provider  Electronically Signed by           Bhavik Aggarwal, DO  12/18/22 8230

## 2023-02-15 ENCOUNTER — TELEPHONE (OUTPATIENT)
Dept: FAMILY MEDICINE CLINIC | Facility: CLINIC | Age: 63
End: 2023-02-15

## 2023-02-15 NOTE — TELEPHONE ENCOUNTER
Patient has established with [address] [place] [address]  at [facility]  Please remove PCP     Petrona Lester MA

## 2023-02-23 NOTE — TELEPHONE ENCOUNTER
02/23/23 11:37 AM        The office's request has been received, reviewed, and the patient chart updated  The PCP has successfully been removed with a patient attribution note  This message will now be completed          Thank you  Pinkey Canavan

## 2024-02-21 PROBLEM — Z11.59 SCREENING FOR VIRAL DISEASE: Status: RESOLVED | Noted: 2020-06-30 | Resolved: 2024-02-21

## 2024-03-09 ENCOUNTER — APPOINTMENT (EMERGENCY)
Dept: RADIOLOGY | Facility: HOSPITAL | Age: 64
End: 2024-03-09
Payer: COMMERCIAL

## 2024-03-09 ENCOUNTER — APPOINTMENT (OUTPATIENT)
Dept: RADIOLOGY | Facility: HOSPITAL | Age: 64
End: 2024-03-09
Payer: COMMERCIAL

## 2024-03-09 ENCOUNTER — HOSPITAL ENCOUNTER (EMERGENCY)
Facility: HOSPITAL | Age: 64
Discharge: HOME | End: 2024-03-09
Attending: EMERGENCY MEDICINE
Payer: COMMERCIAL

## 2024-03-09 VITALS
OXYGEN SATURATION: 99 % | TEMPERATURE: 96.7 F | DIASTOLIC BLOOD PRESSURE: 92 MMHG | HEART RATE: 68 BPM | WEIGHT: 174.4 LBS | BODY MASS INDEX: 34.06 KG/M2 | RESPIRATION RATE: 20 BRPM | SYSTOLIC BLOOD PRESSURE: 129 MMHG

## 2024-03-09 DIAGNOSIS — R59.0 MEDIASTINAL ADENOPATHY: ICD-10-CM

## 2024-03-09 DIAGNOSIS — K44.9 HIATAL HERNIA: ICD-10-CM

## 2024-03-09 DIAGNOSIS — K59.00 CONSTIPATION: ICD-10-CM

## 2024-03-09 DIAGNOSIS — R10.9 ABDOMINAL WALL PAIN: Primary | ICD-10-CM

## 2024-03-09 DIAGNOSIS — K29.70 GASTRITIS: ICD-10-CM

## 2024-03-09 LAB
2HR DELTA HS TROPONIN: 0 NG/L
4HR DELTA HS TROPONIN: 0 NG/L
ALBUMIN SERPL BCP-MCNC: 4.4 G/DL (ref 3.5–5)
ALP SERPL-CCNC: 119 U/L (ref 34–104)
ALT SERPL W P-5'-P-CCNC: 26 U/L (ref 7–52)
ANION GAP SERPL CALCULATED.3IONS-SCNC: 7 MMOL/L
AST SERPL W P-5'-P-CCNC: 28 U/L (ref 13–39)
BASOPHILS # BLD AUTO: 0.05 THOUSANDS/ÂΜL (ref 0–0.1)
BASOPHILS NFR BLD AUTO: 1 % (ref 0–1)
BILIRUB SERPL-MCNC: 1.07 MG/DL (ref 0.2–1)
BILIRUB UR QL STRIP: NEGATIVE
BUN SERPL-MCNC: 11 MG/DL (ref 5–25)
CALCIUM SERPL-MCNC: 10.4 MG/DL (ref 8.4–10.2)
CARDIAC TROPONIN I PNL SERPL HS: 3 NG/L
CHLORIDE SERPL-SCNC: 105 MMOL/L (ref 96–108)
CLARITY UR: CLEAR
CO2 SERPL-SCNC: 27 MMOL/L (ref 21–32)
COLOR UR: YELLOW
CREAT SERPL-MCNC: 0.64 MG/DL (ref 0.6–1.3)
EOSINOPHIL # BLD AUTO: 0.8 THOUSAND/ÂΜL (ref 0–0.61)
EOSINOPHIL NFR BLD AUTO: 10 % (ref 0–6)
ERYTHROCYTE [DISTWIDTH] IN BLOOD BY AUTOMATED COUNT: 13.2 % (ref 11.6–15.1)
FLUAV RNA RESP QL NAA+PROBE: NEGATIVE
FLUBV RNA RESP QL NAA+PROBE: NEGATIVE
GFR SERPL CREATININE-BSD FRML MDRD: 95 ML/MIN/1.73SQ M
GLUCOSE SERPL-MCNC: 140 MG/DL (ref 65–140)
GLUCOSE UR STRIP-MCNC: NEGATIVE MG/DL
HCT VFR BLD AUTO: 46.3 % (ref 34.8–46.1)
HGB BLD-MCNC: 15.1 G/DL (ref 11.5–15.4)
HGB UR QL STRIP.AUTO: NEGATIVE
IMM GRANULOCYTES # BLD AUTO: 0.02 THOUSAND/UL (ref 0–0.2)
IMM GRANULOCYTES NFR BLD AUTO: 0 % (ref 0–2)
KETONES UR STRIP-MCNC: NEGATIVE MG/DL
LEUKOCYTE ESTERASE UR QL STRIP: NEGATIVE
LIPASE SERPL-CCNC: 29 U/L (ref 11–82)
LYMPHOCYTES # BLD AUTO: 2.43 THOUSANDS/ÂΜL (ref 0.6–4.47)
LYMPHOCYTES NFR BLD AUTO: 31 % (ref 14–44)
MAGNESIUM SERPL-MCNC: 2.1 MG/DL (ref 1.9–2.7)
MCH RBC QN AUTO: 26.6 PG (ref 26.8–34.3)
MCHC RBC AUTO-ENTMCNC: 32.6 G/DL (ref 31.4–37.4)
MCV RBC AUTO: 82 FL (ref 82–98)
MONOCYTES # BLD AUTO: 0.78 THOUSAND/ÂΜL (ref 0.17–1.22)
MONOCYTES NFR BLD AUTO: 10 % (ref 4–12)
NEUTROPHILS # BLD AUTO: 3.87 THOUSANDS/ÂΜL (ref 1.85–7.62)
NEUTS SEG NFR BLD AUTO: 48 % (ref 43–75)
NITRITE UR QL STRIP: NEGATIVE
NRBC BLD AUTO-RTO: 0 /100 WBCS
PH UR STRIP.AUTO: 6 [PH]
PLATELET # BLD AUTO: 252 THOUSANDS/UL (ref 149–390)
PMV BLD AUTO: 11.1 FL (ref 8.9–12.7)
POTASSIUM SERPL-SCNC: 4.2 MMOL/L (ref 3.5–5.3)
PROT SERPL-MCNC: 7.6 G/DL (ref 6.4–8.4)
PROT UR STRIP-MCNC: NEGATIVE MG/DL
RBC # BLD AUTO: 5.67 MILLION/UL (ref 3.81–5.12)
RSV RNA RESP QL NAA+PROBE: NEGATIVE
SARS-COV-2 RNA RESP QL NAA+PROBE: NEGATIVE
SODIUM SERPL-SCNC: 139 MMOL/L (ref 135–147)
SP GR UR STRIP.AUTO: 1.02 (ref 1–1.03)
UROBILINOGEN UR QL STRIP.AUTO: 0.2 E.U./DL
WBC # BLD AUTO: 7.95 THOUSAND/UL (ref 4.31–10.16)

## 2024-03-09 PROCEDURE — 83735 ASSAY OF MAGNESIUM: CPT | Performed by: EMERGENCY MEDICINE

## 2024-03-09 PROCEDURE — 93005 ELECTROCARDIOGRAM TRACING: CPT

## 2024-03-09 PROCEDURE — 81003 URINALYSIS AUTO W/O SCOPE: CPT | Performed by: EMERGENCY MEDICINE

## 2024-03-09 PROCEDURE — 71260 CT THORAX DX C+: CPT

## 2024-03-09 PROCEDURE — 74177 CT ABD & PELVIS W/CONTRAST: CPT

## 2024-03-09 PROCEDURE — 99285 EMERGENCY DEPT VISIT HI MDM: CPT

## 2024-03-09 PROCEDURE — 36415 COLL VENOUS BLD VENIPUNCTURE: CPT | Performed by: EMERGENCY MEDICINE

## 2024-03-09 PROCEDURE — 0241U HB NFCT DS VIR RESP RNA 4 TRGT: CPT | Performed by: EMERGENCY MEDICINE

## 2024-03-09 PROCEDURE — 83690 ASSAY OF LIPASE: CPT | Performed by: EMERGENCY MEDICINE

## 2024-03-09 PROCEDURE — 85025 COMPLETE CBC W/AUTO DIFF WBC: CPT | Performed by: EMERGENCY MEDICINE

## 2024-03-09 PROCEDURE — 96365 THER/PROPH/DIAG IV INF INIT: CPT

## 2024-03-09 PROCEDURE — 80053 COMPREHEN METABOLIC PANEL: CPT | Performed by: EMERGENCY MEDICINE

## 2024-03-09 PROCEDURE — 71045 X-RAY EXAM CHEST 1 VIEW: CPT

## 2024-03-09 PROCEDURE — 84484 ASSAY OF TROPONIN QUANT: CPT | Performed by: EMERGENCY MEDICINE

## 2024-03-09 PROCEDURE — 96375 TX/PRO/DX INJ NEW DRUG ADDON: CPT

## 2024-03-09 PROCEDURE — 99285 EMERGENCY DEPT VISIT HI MDM: CPT | Performed by: EMERGENCY MEDICINE

## 2024-03-09 RX ORDER — LIDOCAINE 50 MG/G
1 PATCH TOPICAL DAILY
Qty: 30 PATCH | Refills: 0 | Status: SHIPPED | OUTPATIENT
Start: 2024-03-09

## 2024-03-09 RX ORDER — DICYCLOMINE HCL 20 MG
20 TABLET ORAL EVERY 6 HOURS PRN
Qty: 30 TABLET | Refills: 0 | Status: SHIPPED | OUTPATIENT
Start: 2024-03-09

## 2024-03-09 RX ORDER — FAMOTIDINE 10 MG/ML
20 INJECTION, SOLUTION INTRAVENOUS ONCE
Status: COMPLETED | OUTPATIENT
Start: 2024-03-09 | End: 2024-03-09

## 2024-03-09 RX ORDER — POLYETHYLENE GLYCOL 3350 17 G/17G
17 POWDER, FOR SOLUTION ORAL DAILY
Qty: 119 G | Refills: 0 | Status: SHIPPED | OUTPATIENT
Start: 2024-03-09 | End: 2024-03-16

## 2024-03-09 RX ORDER — DICYCLOMINE HYDROCHLORIDE 10 MG/1
20 CAPSULE ORAL ONCE
Status: COMPLETED | OUTPATIENT
Start: 2024-03-09 | End: 2024-03-09

## 2024-03-09 RX ORDER — FAMOTIDINE 20 MG/1
20 TABLET, FILM COATED ORAL 2 TIMES DAILY
Qty: 60 TABLET | Refills: 0 | Status: SHIPPED | OUTPATIENT
Start: 2024-03-09

## 2024-03-09 RX ORDER — MAGNESIUM SULFATE HEPTAHYDRATE 40 MG/ML
2 INJECTION, SOLUTION INTRAVENOUS ONCE
Status: COMPLETED | OUTPATIENT
Start: 2024-03-09 | End: 2024-03-09

## 2024-03-09 RX ORDER — KETOROLAC TROMETHAMINE 30 MG/ML
15 INJECTION, SOLUTION INTRAMUSCULAR; INTRAVENOUS ONCE
Status: COMPLETED | OUTPATIENT
Start: 2024-03-09 | End: 2024-03-09

## 2024-03-09 RX ORDER — LIDOCAINE 50 MG/G
1 PATCH TOPICAL ONCE
Status: DISCONTINUED | OUTPATIENT
Start: 2024-03-09 | End: 2024-03-09 | Stop reason: HOSPADM

## 2024-03-09 RX ADMIN — DICYCLOMINE HYDROCHLORIDE 20 MG: 10 CAPSULE ORAL at 16:57

## 2024-03-09 RX ADMIN — IOHEXOL 100 ML: 350 INJECTION, SOLUTION INTRAVENOUS at 14:30

## 2024-03-09 RX ADMIN — FAMOTIDINE 20 MG: 10 INJECTION, SOLUTION INTRAVENOUS at 14:35

## 2024-03-09 RX ADMIN — LIDOCAINE 5% 1 PATCH: 700 PATCH TOPICAL at 16:56

## 2024-03-09 RX ADMIN — KETOROLAC TROMETHAMINE 15 MG: 30 INJECTION, SOLUTION INTRAMUSCULAR; INTRAVENOUS at 14:34

## 2024-03-09 RX ADMIN — MAGNESIUM SULFATE HEPTAHYDRATE 2 G: 40 INJECTION, SOLUTION INTRAVENOUS at 14:36

## 2024-03-09 NOTE — ED PROVIDER NOTES
History  Chief Complaint   Patient presents with    Abdominal Pain     Daughter in law translates. States abdominal pain LUQ, under her rib cage for 4-5 days. No N/V. Denies CP and SOB. Took Tylenol for the pain .     Rash     Area of redness , linear at area of pain noted when EKG done. Daughter in law states patient showed her last night.      64-year-old female with past history of type 2 diabetes, hypertension, CAD, presents to the ED for evaluation of left upper quadrant pain and redness over the past few days.  Patient denies any fevers or chills.  Patient denies any shortness of breath.  Patient denies any cold symptoms.  Tylenol is not helping with her pain at home.      History provided by:  Patient  Abdominal Pain  Associated symptoms: no chest pain, no chills, no cough, no dysuria, no fever, no hematuria, no shortness of breath, no sore throat and no vomiting    Rash  Associated symptoms: abdominal pain    Associated symptoms: no fever, no joint pain, no shortness of breath, no sore throat and not vomiting        Prior to Admission Medications   Prescriptions Last Dose Informant Patient Reported? Taking?   Blood Glucose Monitoring Suppl (ONE TOUCH ULTRA 2) w/Device KIT  Child Yes No   Sig: USE AS DIRECTED ONCE A DAY   Cholecalciferol 1.25 MG (64816 UT) capsule   No No   Sig: Take one capsule by mouth once a week for 8 weeks   Lancets (ONETOUCH DELICA PLUS [id number]) MISC  Child Yes No   Sig: daily Use as directed   OneTouch Ultra test strip   No No   Sig: Use 1 each daily   butalbital-acetaminophen-caffeine (Bac) -40 mg per tablet   No No   Sig: Take 1 tablet by mouth every 4 (four) hours as needed for headaches   hydrOXYzine HCL (ATARAX) 25 mg tablet   No No   Sig: Take 1 tablet (25 mg total) by mouth every 6 (six) hours as needed for anxiety   metFORMIN (GLUCOPHAGE) 1000 MG tablet   No No   Sig: Take 1 tablet (1,000 mg total) by mouth 2 (two) times a day with meals   rosuvastatin (CRESTOR) 5 mg  tablet   No No   Sig: Take 1 tablet (5 mg total) by mouth daily   valsartan (DIOVAN) 80 mg tablet   No No   Sig: Take 1 tablet (80 mg total) by mouth every morning      Facility-Administered Medications: None       Past Medical History:   Diagnosis Date    Coronary artery disease     Hypertension     Type 2 diabetes mellitus (HCC)        Past Surgical History:   Procedure Laterality Date    CHOLECYSTECTOMY  2000    US GUIDED THYROID BIOPSY  11/8/2021       Family History   Problem Relation Age of Onset    Diabetes Mother     Heart attack Father     Diabetes Father     Heart attack Sister     Diabetes Sister      I have reviewed and agree with the history as documented.    E-Cigarette/Vaping    E-Cigarette Use Never User      E-Cigarette/Vaping Substances    Nicotine No     THC No     CBD No     Flavoring No     Other No     Unknown No      Social History     Tobacco Use    Smoking status: Never    Smokeless tobacco: Never   Vaping Use    Vaping status: Never Used   Substance Use Topics    Alcohol use: Not Currently    Drug use: Never       Review of Systems   Constitutional:  Negative for chills and fever.   HENT:  Negative for ear pain and sore throat.    Eyes:  Negative for pain and visual disturbance.   Respiratory:  Negative for cough and shortness of breath.    Cardiovascular:  Negative for chest pain and palpitations.   Gastrointestinal:  Positive for abdominal pain. Negative for vomiting.   Genitourinary:  Negative for dysuria and hematuria.   Musculoskeletal:  Negative for arthralgias and back pain.   Skin:  Positive for rash. Negative for color change.   Neurological:  Negative for seizures and syncope.   All other systems reviewed and are negative.      Physical Exam  Physical Exam  Vitals and nursing note reviewed.   Constitutional:       General: She is not in acute distress.     Appearance: She is well-developed.   HENT:      Head: Normocephalic and atraumatic.   Eyes:      Conjunctiva/sclera:  Conjunctivae normal.   Cardiovascular:      Rate and Rhythm: Normal rate and regular rhythm.      Heart sounds: No murmur heard.  Pulmonary:      Effort: Pulmonary effort is normal. No respiratory distress.      Breath sounds: Normal breath sounds.      Comments: Lungs are clear to auscultation bilaterally.  Tenderness to palpation noted to the left lower anterior rib region.  Abdominal:      Palpations: Abdomen is soft.      Tenderness: There is no abdominal tenderness.      Comments: Abdomen is soft, nondistended, with bowel sound present all 4 quadrants.  Moderate tenderness to palpation noted to the left upper quadrant region along the inferior rib border.   Musculoskeletal:         General: No swelling.      Cervical back: Neck supple.   Skin:     General: Skin is warm and dry.      Capillary Refill: Capillary refill takes less than 2 seconds.   Neurological:      Mental Status: She is alert.   Psychiatric:         Mood and Affect: Mood normal.         Vital Signs  ED Triage Vitals [03/09/24 1123]   Temperature Pulse Respirations Blood Pressure SpO2   (!) 96.7 °F (35.9 °C) 80 20 137/73 98 %      Temp Source Heart Rate Source Patient Position - Orthostatic VS BP Location FiO2 (%)   Tympanic Monitor Sitting Left arm --      Pain Score       6           Vitals:    03/09/24 1530 03/09/24 1545 03/09/24 1600 03/09/24 1615   BP:  129/61  129/92   Pulse: 68 70 66 68   Patient Position - Orthostatic VS:             Visual Acuity      ED Medications  Medications   lidocaine (LIDODERM) 5 % patch 1 patch (1 patch Topical Medication Applied 3/9/24 1656)   ketorolac (TORADOL) injection 15 mg (15 mg Intravenous Given 3/9/24 1434)   Famotidine (PF) (PEPCID) injection 20 mg (20 mg Intravenous Given 3/9/24 1435)   magnesium sulfate 2 g/50 mL IVPB (premix) 2 g (0 g Intravenous Stopped 3/9/24 1546)   iohexol (OMNIPAQUE) 350 MG/ML injection (MULTI-DOSE) 100 mL (100 mL Intravenous Given 3/9/24 1430)   dicyclomine (BENTYL) capsule 20  mg (20 mg Oral Given 3/9/24 2987)       Diagnostic Studies  Results Reviewed       Procedure Component Value Units Date/Time    HS Troponin I 4hr [[id number]]  (Normal) Collected: 03/09/24 1556    Lab Status: Final result Specimen: Blood from Arm, Right Updated: 03/09/24 1624     hs TnI 4hr 3 ng/L      Delta 4hr hsTnI 0 ng/L     HS Troponin I 2hr [[id number]]  (Normal) Collected: 03/09/24 1359    Lab Status: Final result Specimen: Blood from Arm, Right Updated: 03/09/24 1430     hs TnI 2hr 3 ng/L      Delta 2hr hsTnI 0 ng/L     Lipase [[id number]]  (Normal) Collected: 03/09/24 1156    Lab Status: Final result Specimen: Blood from Arm, Left Updated: 03/09/24 1313     Lipase 29 u/L     Comprehensive metabolic panel [[id number]]  (Abnormal) Collected: 03/09/24 1156    Lab Status: Final result Specimen: Blood from Arm, Left Updated: 03/09/24 1313     Sodium 139 mmol/L      Potassium 4.2 mmol/L      Chloride 105 mmol/L      CO2 27 mmol/L      ANION GAP 7 mmol/L      BUN 11 mg/dL      Creatinine 0.64 mg/dL      Glucose 140 mg/dL      Calcium 10.4 mg/dL      AST 28 U/L      ALT 26 U/L      Alkaline Phosphatase 119 U/L      Total Protein 7.6 g/dL      Albumin 4.4 g/dL      Total Bilirubin 1.07 mg/dL      eGFR 95 ml/min/1.73sq m     Narrative:      National Kidney Disease Foundation guidelines for Chronic Kidney Disease (CKD):     Stage 1 with normal or high GFR (GFR > 90 mL/min/1.73 square meters)    Stage 2 Mild CKD (GFR = 60-89 mL/min/1.73 square meters)    Stage 3A Moderate CKD (GFR = 45-59 mL/min/1.73 square meters)    Stage 3B Moderate CKD (GFR = 30-44 mL/min/1.73 square meters)    Stage 4 Severe CKD (GFR = 15-29 mL/min/1.73 square meters)    Stage 5 End Stage CKD (GFR <15 mL/min/1.73 square meters)  Note: GFR calculation is accurate only with a steady state creatinine    Magnesium [[id number]]  (Normal) Collected: 03/09/24 1156    Lab Status: Final result Specimen: Blood from Arm, Left Updated: 03/09/24 1313     Magnesium  2.1 mg/dL     FLU/RSV/COVID - if FLU/RSV clinically relevant [[id number]]  (Normal) Collected: 03/09/24 1156    Lab Status: Final result Specimen: Nares from Nose Updated: 03/09/24 1250     SARS-CoV-2 Negative     INFLUENZA A PCR Negative     INFLUENZA B PCR Negative     RSV PCR Negative    Narrative:      FOR PEDIATRIC PATIENTS - copy/paste COVID Guidelines URL to browser: [email or web address]    SARS-CoV-2 assay is a Nucleic Acid Amplification assay intended for the  qualitative detection of nucleic acid from SARS-CoV-2 in nasopharyngeal  swabs. Results are for the presumptive identification of SARS-CoV-2 RNA.    Positive results are indicative of infection with SARS-CoV-2, the virus  causing COVID-19, but do not rule out bacterial infection or co-infection  with other viruses. Laboratories within the [place] and its  territories are required to report all positive results to the appropriate  public health authorities. Negative results do not preclude SARS-CoV-2  infection and should not be used as the sole basis for treatment or other  patient management decisions. Negative results must be combined with  clinical observations, patient history, and epidemiological information.  This test has not been FDA cleared or approved.    This test has been authorized by FDA under an Emergency Use Authorization  (EUA). This test is only authorized for the duration of time the  declaration that circumstances exist justifying the authorization of the  emergency use of an in vitro diagnostic tests for detection of SARS-CoV-2  virus and/or diagnosis of COVID-19 infection under section 564(b)(1) of  the Act, 21 U.S.C. 360bbb-3(b)(1), unless the authorization is terminated  or revoked sooner. The test has been validated but independent review by FDA  and CLIA is pending.    Test performed using Venga: This RT-PCR assay targets N2,  a region unique to  SARS-CoV-2. A conserved region in the E-gene was chosen  for pan-Sarbecovirus detection which includes SARS-CoV-2.    According to CMS-2020-01-R, this platform meets the definition of high-throughput technology.    HS Troponin 0hr (reflex protocol) [[id number]]  (Normal) Collected: 03/09/24 1156    Lab Status: Final result Specimen: Blood from Arm, Left Updated: 03/09/24 1232     hs TnI 0hr 3 ng/L     UA w Reflex to Microscopic w Reflex to Culture [[id number]] Collected: 03/09/24 1200    Lab Status: Final result Specimen: Urine, Other Updated: 03/09/24 1223     Color, UA Yellow     Clarity, UA Clear     Specific Gravity, UA 1.025     pH, UA 6.0     Leukocytes, UA Negative     Nitrite, UA Negative     Protein, UA Negative mg/dl      Glucose, UA Negative mg/dl      Ketones, UA Negative mg/dl      Urobilinogen, UA 0.2 E.U./dl      Bilirubin, UA Negative     Occult Blood, UA Negative    CBC and differential [[id number]]  (Abnormal) Collected: 03/09/24 1156    Lab Status: Final result Specimen: Blood from Arm, Left Updated: 03/09/24 1205     WBC 7.95 Thousand/uL      RBC 5.67 Million/uL      Hemoglobin 15.1 g/dL      Hematocrit 46.3 %      MCV 82 fL      MCH 26.6 pg      MCHC 32.6 g/dL      RDW 13.2 %      MPV 11.1 fL      Platelets 252 Thousands/uL      nRBC 0 /100 WBCs      Neutrophils Relative 48 %      Immat GRANS % 0 %      Lymphocytes Relative 31 %      Monocytes Relative 10 %      Eosinophils Relative 10 %      Basophils Relative 1 %      Neutrophils Absolute 3.87 Thousands/µL      Immature Grans Absolute 0.02 Thousand/uL      Lymphocytes Absolute 2.43 Thousands/µL      Monocytes Absolute 0.78 Thousand/µL      Eosinophils Absolute 0.80 Thousand/µL      Basophils Absolute 0.05 Thousands/µL                    CT chest abdomen pelvis w contrast   Final Result by Venkat Pruett MD (03/09 1610)      No evidence of infiltrate or effusion.      Mild mediastinal adenopathy is nonspecific some calcifications do suggests  prior granulomatous disease. One noncalcified node measures 1.2 cm. Follow-up CT in 3 months time is suggested given no priors.      No evidence of bowel obstruction or inflammatory change. Nodularity in the uterus may be related to fibroids. This is poorly defined. Nonemergent pelvic ultrasound follow-up is advised.      Hiatal hernia with gastric fold hyperenhancement suggests possible gastritis. GI follow-up would be useful.      Thyroid enlargement should be correlated with TFTs.      The study was marked in EPIC for immediate notification.         Workstation performed: [id number]         XR chest 1 view portable   Final Result by Bautista Gama MD (03/09 1258)      Mildly limited but unremarkable chest x-ray            Workstation performed: [id number]                    Procedures  ECG 12 Lead Documentation Only    Date/Time: 3/9/2024 11:32 AM    Performed by: Jerry Woody DO  Authorized by: Jerry Woody DO    Indications / Diagnosis:  Chest pain  ECG reviewed by me, the ED Provider: yes    Patient location:  ED  Previous ECG:     Previous ECG:  Compared to current    Similarity:  Changes noted  Interpretation:     Interpretation: abnormal    Comments:      Sinus rhythm, rate 75, normal axis, normal intervals, no acute ST elevations noted, T wave inversions noted in V4 through V6 suggesting anterolateral ischemic changes, occasional PVCs noted that are new compared to previous EKG.            ED Course                               SBIRT 22yo+      Flowsheet Row Most Recent Value   Initial Alcohol Screen: US AUDIT-C     1. How often do you have a drink containing alcohol? 0 Filed at: 03/09/2024 1601   2. How many drinks containing alcohol do you have on a typical day you are drinking?  0 Filed at: 03/09/2024 1601   3a. Male UNDER 65: How often do you have five or more drinks on one occasion? 0 Filed at: 03/09/2024 1601   3b. FEMALE Any Age, or MALE 65+: How often do you have 4 or more drinks  on one occassion? 0 Filed at: 03/09/2024 1601   Audit-C Score 0 Filed at: 03/09/2024 1601   HUYEN: How many times in the past year have you...    Used an illegal drug or used a prescription medication for non-medical reasons? Never Filed at: 03/09/2024 1601                      Medical Decision Making  Patient's lab work was essentially unremarkable.  Patient has history of cholecystectomy.  Alk phos was mildly elevated however patient has no right upper quadrant pain.  Patient's left upper quadrant abdominal pain improved with IV Tylenol in the ED.  CT scan showed concern for mediastinal adenopathy which would require follow-up scan in 2 to 3 months.  Patient told to discuss this finding with PCP.  CT scan also showed concern for gastritis and hiatal hernia.  Patient placed on Pepcid.  At this time the etiology of patient's left upper quadrant abdominal wall pain is unclear however it may be musculoskeletal in nature.  Patient placed on Lidoderm patch as well as Bentyl and discharged home with over-the-counter medication.  At this time patient is discharged with follow-up to PCP, GI, and her cardiologist.  Patient also stated that she feels constipated lately.  Patient told to take over-the-counter psyllium husk as well as MiraLAX for constipation.  Close return instructions given to return to the ER for any worsening symptoms.  Patient agrees with discharge plan.  Patient well appearing at time of discharge.    Please Note: Fluency Direct voice recognition software may have been used in the creation of this document. Wrong words or sound a like substitutions may have occurred due to the inherent limitations of the voice software.         Amount and/or Complexity of Data Reviewed  Labs: ordered.  Radiology: ordered.    Risk  Prescription drug management.             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                   Follow-up Information       Follow up With Specialties Details Why Contact Info Additional Information    Marc Henriquez MD Internal Medicine Schedule an appointment as soon as possible for a visit in 3 days Your CT scan today showed concern for mediastinal adenopathy.  You will need follow-up CT scan in 3 months.  Please discuss this with your family doctor during your next visit. [address] [facility].  [facility] [address]  [phone]       [facility] Schedule an appointment as soon as possible for a visit   [address] [place] [id number]  [phone] [facility], [address] [place] [address], [address], [phone]       [phone]            Discharge Medication List as of 3/9/2024  4:54 PM        START taking these medications    Details   dicyclomine (BENTYL) 20 mg tablet Take 1 tablet (20 mg total) by mouth every 6 (six) hours as needed (abd pain), Starting Sat 3/9/2024, Normal      famotidine (PEPCID) 20 mg tablet Take 1 tablet (20 mg total) by mouth 2 (two) times a day, Starting  Sat 3/9/2024, Normal      lidocaine (Lidoderm) 5 % Apply 1 patch topically over 12 hours daily Remove & Discard patch within 12 hours or as directed by MD, Starting Sat 3/9/2024, Normal      polyethylene glycol (MIRALAX) 17 g packet Take 17 g by mouth daily for 7 days, Starting Sat 3/9/2024, Until Sat 3/16/2024, Normal           CONTINUE these medications which have NOT CHANGED    Details   Blood Glucose Monitoring Suppl (ONE TOUCH ULTRA 2) w/Device KIT USE AS DIRECTED ONCE A DAY, Historical Med      butalbital-acetaminophen-caffeine (Bac) -40 mg per tablet Take 1 tablet by mouth every 4 (four) hours as needed for headaches, Starting Tue 9/28/2021, Normal      Cholecalciferol 1.25 MG (50556 UT) capsule Take one capsule by mouth once a week for 8 weeks, Normal      hydrOXYzine HCL (ATARAX) 25 mg tablet Take 1 tablet (25 mg total) by mouth every 6 (six) hours as needed for anxiety, Starting Fri 10/22/2021, Normal      Lancets (ONETOUCH DELICA PLUS [id number]) MISC daily Use as directed, Starting Thu 6/25/2020, Historical Med      metFORMIN (GLUCOPHAGE) 1000 MG tablet Take 1 tablet (1,000 mg total) by mouth 2 (two) times a day with meals, Starting Mon 11/1/2021, Until Sun 1/30/2022, Normal      OneTouch Ultra test strip Use 1 each daily, Starting Wed 10/6/2021, Normal      rosuvastatin (CRESTOR) 5 mg tablet Take 1 tablet (5 mg total) by mouth daily, Starting Mon 11/1/2021, Normal      valsartan (DIOVAN) 80 mg tablet Take 1 tablet (80 mg total) by mouth every morning, Starting Mon 11/1/2021, Normal             No discharge procedures on file.    PDMP Review       None            ED Provider  Electronically Signed by             Jerry Woody DO  03/09/24 9600

## 2024-03-11 LAB
ATRIAL RATE: 75 BPM
P AXIS: 54 DEGREES
PR INTERVAL: 130 MS
QRS AXIS: 27 DEGREES
QRSD INTERVAL: 78 MS
QT INTERVAL: 368 MS
QTC INTERVAL: 410 MS
T WAVE AXIS: 45 DEGREES
VENTRICULAR RATE: 75 BPM

## 2024-03-11 PROCEDURE — 93010 ELECTROCARDIOGRAM REPORT: CPT | Performed by: INTERNAL MEDICINE

## 2024-10-27 ENCOUNTER — APPOINTMENT (EMERGENCY)
Dept: RADIOLOGY | Facility: HOSPITAL | Age: 64
End: 2024-10-27
Payer: COMMERCIAL

## 2024-10-27 ENCOUNTER — HOSPITAL ENCOUNTER (EMERGENCY)
Facility: HOSPITAL | Age: 64
Discharge: HOME | End: 2024-10-27
Attending: EMERGENCY MEDICINE
Payer: COMMERCIAL

## 2024-10-27 VITALS
TEMPERATURE: 98 F | HEIGHT: 60 IN | DIASTOLIC BLOOD PRESSURE: 83 MMHG | WEIGHT: 175 LBS | SYSTOLIC BLOOD PRESSURE: 141 MMHG | HEART RATE: 86 BPM | RESPIRATION RATE: 18 BRPM | BODY MASS INDEX: 34.36 KG/M2 | OXYGEN SATURATION: 97 %

## 2024-10-27 DIAGNOSIS — M25.561 ACUTE PAIN OF RIGHT KNEE: Primary | ICD-10-CM

## 2024-10-27 PROCEDURE — 99283 EMERGENCY DEPT VISIT LOW MDM: CPT

## 2024-10-27 PROCEDURE — 99284 EMERGENCY DEPT VISIT MOD MDM: CPT | Performed by: EMERGENCY MEDICINE

## 2024-10-27 PROCEDURE — 73564 X-RAY EXAM KNEE 4 OR MORE: CPT

## 2024-10-27 RX ORDER — NAPROXEN 500 MG/1
500 TABLET ORAL 2 TIMES DAILY WITH MEALS
Qty: 30 TABLET | Refills: 0 | Status: SHIPPED | OUTPATIENT
Start: 2024-10-27

## 2024-10-27 RX ORDER — IBUPROFEN 400 MG/1
400 TABLET, FILM COATED ORAL ONCE
Status: COMPLETED | OUTPATIENT
Start: 2024-10-27 | End: 2024-10-27

## 2024-10-27 RX ORDER — ACETAMINOPHEN 325 MG/1
975 TABLET ORAL ONCE
Status: COMPLETED | OUTPATIENT
Start: 2024-10-27 | End: 2024-10-27

## 2024-10-27 RX ADMIN — IBUPROFEN 400 MG: 400 TABLET, FILM COATED ORAL at 15:35

## 2024-10-27 RX ADMIN — DICLOFENAC SODIUM 2 G: 10 GEL TOPICAL at 15:38

## 2024-10-27 RX ADMIN — ACETAMINOPHEN 975 MG: 325 TABLET ORAL at 15:36

## 2024-10-27 NOTE — DISCHARGE INSTRUCTIONS
Follow-up with orthopedics for further care. Contact info provided below if needed.  Use over the counter medications as stated on the bottle as needed for pain control.  Return to the ED with new or worsening symptoms.

## 2024-10-27 NOTE — ED PROVIDER NOTES
Time reflects when diagnosis was documented in both MDM as applicable and the Disposition within this note       Time User Action Codes Description Comment    10/27/2024  4:05 PM Gita Mendoza Add [M25.561] Acute pain of right knee           ED Disposition       ED Disposition   Discharge    Condition   Stable    Date/Time   Sun Oct 27, 2024  4:05 PM    Comment   Shanique Khan discharge to home/self care.                   Assessment & Plan       Medical Decision Making  Pt is a 63yo F who presents with knee pain.     Differential diagnosis to include but not limited to fracture, dislocation, soft tissue injury, ligamentous injury.  Will plan for x-ray to rule out osseous abnormality.  Will treat symptomatically.  See ED course for results and details.    Plan to discharge pt with f/u to orthopedics. Discussed returning the ED with new or worsening of symptoms. Discussed use of over the counter medications as stated on the bottle as needed for pain. Pt expressed understanding of discharge instructions, return precautions, and medication instructions and is stable for discharge at this time. All questions were answered and pt was discharged without incident.       Amount and/or Complexity of Data Reviewed  Radiology: ordered. Decision-making details documented in ED Course.    Risk  OTC drugs.  Prescription drug management.        ED Course as of 10/27/24 1641   [place] Oct 27, 2024   1602 XR knee 4+ vw right injury  No acute fracture on wet read.        Medications   Diclofenac Sodium (VOLTAREN) 1 % topical gel 2 g (2 g Topical Given 10/27/24 1538)   acetaminophen (TYLENOL) tablet 975 mg (975 mg Oral Given 10/27/24 1536)   ibuprofen (MOTRIN) tablet 400 mg (400 mg Oral Given 10/27/24 1535)       ED Risk Strat Scores                           SBIRT 22yo+      Flowsheet Row Most Recent Value   Initial Alcohol Screen: US AUDIT-C     1. How often do you have a drink containing alcohol? 0 Filed at: 10/27/2024 1995   2. How  "many drinks containing alcohol do you have on a typical day you are drinking?  0 Filed at: 10/27/2024 1457   3a. Male UNDER 65: How often do you have five or more drinks on one occasion? 0 Filed at: 10/27/2024 1453   3b. FEMALE Any Age, or MALE 65+: How often do you have 4 or more drinks on one occassion? 0 Filed at: 10/27/2024 1453   Audit-C Score 0 Filed at: 10/27/2024 1453   HUYEN: How many times in the past year have you...    Used an illegal drug or used a prescription medication for non-medical reasons? Never Filed at: 10/27/2024 1453                            History of Present Illness       Chief Complaint   Patient presents with    Knee Pain     Pt. States twisted her right knee in backyard yesterday now having trouble with weight baring. Swollen around knee       Past Medical History:   Diagnosis Date    Coronary artery disease     Hypertension     Type 2 diabetes mellitus (HCC)       Past Surgical History:   Procedure Laterality Date    CHOLECYSTECTOMY  2000    US GUIDED THYROID BIOPSY  11/8/2021      Family History   Problem Relation Age of Onset    Diabetes Mother     Heart attack Father     Diabetes Father     Heart attack Sister     Diabetes Sister       Social History     Tobacco Use    Smoking status: Never    Smokeless tobacco: Never   Vaping Use    Vaping status: Never Used   Substance Use Topics    Alcohol use: Not Currently    Drug use: Never      E-Cigarette/Vaping    E-Cigarette Use Never User       E-Cigarette/Vaping Substances    Nicotine No     THC No     CBD No     Flavoring No     Other No     Unknown No       I have reviewed and agree with the history as documented.     Pt is a 65yo F who presents for knee pain.  Approximately 2 days ago patient was walking on uneven ground when she \"twisted\" her right knee.  Patient did not fall onto the knee or fall at all.  Since that time, patient has been complaining of pain in the right knee.  She has been intermittently taken Tylenol and naproxen, " however has not taken anything today.  She reports difficulty with ambulation secondary to the pain and has therefore been using a walking stick.  Patient denies any other pain or complaints.  Patient denies any previous injuries or surgeries to this knee.    Pt is [place] speaking only and opted for daughter in law to translate.             Objective       ED Triage Vitals [10/27/24 1451]   Temperature Pulse Blood Pressure Respirations SpO2 Patient Position - Orthostatic VS   98 °F (36.7 °C) 86 141/83 18 97 % --      Temp src Heart Rate Source BP Location FiO2 (%) Pain Score    -- -- -- -- 8      Vitals      Date and Time Temp Pulse SpO2 Resp BP Pain Score FACES Pain Rating User   10/27/24 1451 98 °F (36.7 °C) 86 97 % 18 141/83 8 -- HD            Physical Exam  Vitals reviewed.   Constitutional:       General: She is not in acute distress.     Appearance: She is well-developed. She is not toxic-appearing or diaphoretic.   HENT:      Head: Normocephalic and atraumatic.      Right Ear: External ear normal.      Left Ear: External ear normal.      Nose: Nose normal.      Mouth/Throat:      Pharynx: Oropharynx is clear.   Eyes:      Extraocular Movements: Extraocular movements intact.      Pupils: Pupils are equal, round, and reactive to light.   Cardiovascular:      Rate and Rhythm: Normal rate and regular rhythm.      Heart sounds: Normal heart sounds.   Pulmonary:      Effort: Pulmonary effort is normal.   Abdominal:      Palpations: Abdomen is soft.   Musculoskeletal:      Cervical back: Normal range of motion and neck supple.      Right knee: Swelling (mild) present. No deformity, erythema, ecchymosis, lacerations or crepitus. Decreased range of motion (2/2 pain). Tenderness present over the medial joint line and lateral joint line. Normal alignment. Normal pulse.      Comments: CMS intact distally in bilateral lower extremities   Skin:     General: Skin is warm and dry.      Capillary Refill: Capillary refill takes  less than 2 seconds.   Neurological:      Mental Status: She is alert and oriented to person, place, and time.   Psychiatric:         Speech: Speech normal.         Behavior: Behavior is cooperative.         Results Reviewed       None            XR knee 4+ vw right injury    (Results Pending)       Procedures    ED Medication and Procedure Management   Prior to Admission Medications   Prescriptions Last Dose Informant Patient Reported? Taking?   Blood Glucose Monitoring Suppl (ONE TOUCH ULTRA 2) w/Device KIT  Child Yes No   Sig: USE AS DIRECTED ONCE A DAY   Cholecalciferol 1.25 MG (33374 UT) capsule   No No   Sig: Take one capsule by mouth once a week for 8 weeks   Lancets (ONETOUCH DELICA PLUS [id number]) MISC  Child Yes No   Sig: daily Use as directed   OneTouch Ultra test strip   No No   Sig: Use 1 each daily   butalbital-acetaminophen-caffeine (Bac) -40 mg per tablet   No No   Sig: Take 1 tablet by mouth every 4 (four) hours as needed for headaches   dicyclomine (BENTYL) 20 mg tablet   No No   Sig: Take 1 tablet (20 mg total) by mouth every 6 (six) hours as needed (abd pain)   famotidine (PEPCID) 20 mg tablet   No No   Sig: Take 1 tablet (20 mg total) by mouth 2 (two) times a day   hydrOXYzine HCL (ATARAX) 25 mg tablet Past Month  No Yes   Sig: Take 1 tablet (25 mg total) by mouth every 6 (six) hours as needed for anxiety   lidocaine (Lidoderm) 5 %   No No   Sig: Apply 1 patch topically over 12 hours daily Remove & Discard patch within 12 hours or as directed by MD   metFORMIN (GLUCOPHAGE) 1000 MG tablet 10/27/2024  No Yes   Sig: Take 1 tablet (1,000 mg total) by mouth 2 (two) times a day with meals   polyethylene glycol (MIRALAX) 17 g packet   No No   Sig: Take 17 g by mouth daily for 7 days   rosuvastatin (CRESTOR) 5 mg tablet 10/27/2024  No Yes   Sig: Take 1 tablet (5 mg total) by mouth daily   valsartan (DIOVAN) 80 mg tablet 10/27/2024  No Yes   Sig: Take 1 tablet (80 mg total) by mouth every morning       Facility-Administered Medications: None     Patient's Medications   Discharge Prescriptions    NAPROXEN (NAPROSYN) 500 MG TABLET    Take 1 tablet (500 mg total) by mouth 2 (two) times a day with meals       Start Date: 10/27/2024End Date: --       Order Dose: 500 mg       Quantity: 30 tablet    Refills: 0     No discharge procedures on file.  ED SEPSIS DOCUMENTATION   Time reflects when diagnosis was documented in both MDM as applicable and the Disposition within this note       Time User Action Codes Description Comment    10/27/2024  4:05 PM Gita Mendoza Add [M25.561] Acute pain of right knee                  Gita Mendoza MD  10/27/24 4364